# Patient Record
Sex: MALE | Race: WHITE | Employment: FULL TIME | URBAN - NONMETROPOLITAN AREA
[De-identification: names, ages, dates, MRNs, and addresses within clinical notes are randomized per-mention and may not be internally consistent; named-entity substitution may affect disease eponyms.]

---

## 2017-03-20 ENCOUNTER — OFFICE VISIT (OUTPATIENT)
Dept: ENDOCRINOLOGY | Age: 37
End: 2017-03-20

## 2017-03-20 VITALS
DIASTOLIC BLOOD PRESSURE: 70 MMHG | WEIGHT: 191 LBS | HEART RATE: 80 BPM | HEIGHT: 74 IN | SYSTOLIC BLOOD PRESSURE: 128 MMHG | RESPIRATION RATE: 20 BRPM | BODY MASS INDEX: 24.51 KG/M2

## 2017-03-20 DIAGNOSIS — Z79.4 TYPE 2 DIABETES MELLITUS WITHOUT COMPLICATION, WITH LONG-TERM CURRENT USE OF INSULIN (HCC): ICD-10-CM

## 2017-03-20 DIAGNOSIS — E78.00 PURE HYPERCHOLESTEROLEMIA: ICD-10-CM

## 2017-03-20 DIAGNOSIS — E11.9 TYPE 2 DIABETES MELLITUS WITHOUT COMPLICATION, WITH LONG-TERM CURRENT USE OF INSULIN (HCC): ICD-10-CM

## 2017-03-20 PROCEDURE — 99214 OFFICE O/P EST MOD 30 MIN: CPT | Performed by: NURSE PRACTITIONER

## 2017-03-20 RX ORDER — GLIPIZIDE 5 MG/1
TABLET ORAL
Qty: 15 TABLET | Refills: 5 | Status: SHIPPED | OUTPATIENT
Start: 2017-03-20 | End: 2017-06-19 | Stop reason: SDUPTHER

## 2017-03-20 RX ORDER — ATORVASTATIN CALCIUM 20 MG/1
20 TABLET, FILM COATED ORAL DAILY
Qty: 30 TABLET | Refills: 5 | Status: SHIPPED | OUTPATIENT
Start: 2017-03-20 | End: 2017-06-19 | Stop reason: SDUPTHER

## 2017-03-20 ASSESSMENT — ENCOUNTER SYMPTOMS
ABDOMINAL PAIN: 0
NAUSEA: 0
SHORTNESS OF BREATH: 0
TROUBLE SWALLOWING: 0
VOICE CHANGE: 0
VOMITING: 0

## 2017-03-21 LAB
AVERAGE GLUCOSE: 143 MG/DL (ref 66–114)
HBA1C MFR BLD: 6.6 % (ref 4.2–5.8)

## 2017-06-10 LAB
ALBUMIN SERPL-MCNC: 4.5 G/DL (ref 3.2–5.3)
ALK PHOSPHATASE: 81 IU/L (ref 35–121)
ALT SERPL-CCNC: 26 IU/L (ref 5–59)
ANION GAP SERPL CALCULATED.3IONS-SCNC: 13 MMOL/L
AST SERPL-CCNC: 15 IU/L (ref 10–42)
BILIRUB SERPL-MCNC: 0.6 MG/DL (ref 0.2–1.3)
BUN BLDV-MCNC: 11 MG/DL (ref 10–20)
CALCIUM SERPL-MCNC: 9.7 MG/DL (ref 8.7–10.8)
CHLORIDE BLD-SCNC: 102 MMOL/L (ref 95–111)
CHOLESTEROL/HDL RATIO: 2.4
CHOLESTEROL: 122 MG/DL
CO2: 26 MMOL/L (ref 21–32)
CREAT SERPL-MCNC: 0.8 MG/DL (ref 0.5–1.3)
EGFR AFRICAN AMERICAN: 133
EGFR IF NONAFRICAN AMERICAN: 110
GLUCOSE: 106 MG/DL (ref 70–100)
HDLC SERPL-MCNC: 51 MG/DL (ref 40–60)
LDL CHOLESTEROL CALCULATED: 58 MG/DL
LDL/HDL RATIO: 1.1
POTASSIUM SERPL-SCNC: 4.2 MMOL/L (ref 3.5–5.4)
SODIUM BLD-SCNC: 137 MMOL/L (ref 134–147)
TOTAL PROTEIN: 7.4 G/DL (ref 5.8–8)
TRIGL SERPL-MCNC: 67 MG/DL
VLDLC SERPL CALC-MCNC: 13 MG/DL

## 2017-06-12 LAB
AVERAGE GLUCOSE: 131 MG/DL (ref 66–114)
HBA1C MFR BLD: 6.2 % (ref 4.2–5.8)

## 2017-06-19 ENCOUNTER — OFFICE VISIT (OUTPATIENT)
Dept: ENDOCRINOLOGY | Age: 37
End: 2017-06-19

## 2017-06-19 VITALS
RESPIRATION RATE: 16 BRPM | HEART RATE: 76 BPM | BODY MASS INDEX: 23.23 KG/M2 | DIASTOLIC BLOOD PRESSURE: 74 MMHG | WEIGHT: 181 LBS | HEIGHT: 74 IN | SYSTOLIC BLOOD PRESSURE: 123 MMHG

## 2017-06-19 DIAGNOSIS — E78.00 PURE HYPERCHOLESTEROLEMIA: ICD-10-CM

## 2017-06-19 DIAGNOSIS — E11.9 TYPE 2 DIABETES MELLITUS WITHOUT COMPLICATION, WITH LONG-TERM CURRENT USE OF INSULIN (HCC): Primary | ICD-10-CM

## 2017-06-19 DIAGNOSIS — Z79.4 TYPE 2 DIABETES MELLITUS WITHOUT COMPLICATION, WITH LONG-TERM CURRENT USE OF INSULIN (HCC): Primary | ICD-10-CM

## 2017-06-19 DIAGNOSIS — E04.1 LEFT THYROID NODULE: ICD-10-CM

## 2017-06-19 PROCEDURE — 99214 OFFICE O/P EST MOD 30 MIN: CPT | Performed by: INTERNAL MEDICINE

## 2017-06-19 RX ORDER — ATORVASTATIN CALCIUM 20 MG/1
20 TABLET, FILM COATED ORAL DAILY
Qty: 30 TABLET | Refills: 5 | Status: SHIPPED | OUTPATIENT
Start: 2017-06-19 | End: 2017-08-15 | Stop reason: SDUPTHER

## 2017-06-19 RX ORDER — GLIPIZIDE 5 MG/1
TABLET ORAL
Qty: 15 TABLET | Refills: 5 | Status: SHIPPED | OUTPATIENT
Start: 2017-06-19 | End: 2017-08-15 | Stop reason: SDUPTHER

## 2017-08-15 DIAGNOSIS — E78.00 PURE HYPERCHOLESTEROLEMIA: ICD-10-CM

## 2017-08-15 DIAGNOSIS — R56.9 PARTIAL SEIZURE (HCC): ICD-10-CM

## 2017-08-15 DIAGNOSIS — Z79.4 TYPE 2 DIABETES MELLITUS WITHOUT COMPLICATION, WITH LONG-TERM CURRENT USE OF INSULIN (HCC): ICD-10-CM

## 2017-08-15 DIAGNOSIS — E11.9 TYPE 2 DIABETES MELLITUS WITHOUT COMPLICATION, WITH LONG-TERM CURRENT USE OF INSULIN (HCC): ICD-10-CM

## 2017-08-15 RX ORDER — LEVETIRACETAM 750 MG/1
750 TABLET ORAL 2 TIMES DAILY
Qty: 180 TABLET | Refills: 1 | Status: SHIPPED | OUTPATIENT
Start: 2017-08-15 | End: 2017-08-21 | Stop reason: SDUPTHER

## 2017-08-17 RX ORDER — GLIPIZIDE 5 MG/1
TABLET ORAL
Qty: 45 TABLET | Refills: 1 | Status: SHIPPED | OUTPATIENT
Start: 2017-08-17 | End: 2017-10-02 | Stop reason: SDUPTHER

## 2017-08-17 RX ORDER — ATORVASTATIN CALCIUM 20 MG/1
20 TABLET, FILM COATED ORAL DAILY
Qty: 90 TABLET | Refills: 1 | Status: SHIPPED | OUTPATIENT
Start: 2017-08-17 | End: 2017-10-02 | Stop reason: SDUPTHER

## 2017-08-21 ENCOUNTER — OFFICE VISIT (OUTPATIENT)
Dept: NEUROLOGY | Age: 37
End: 2017-08-21
Payer: COMMERCIAL

## 2017-08-21 VITALS
DIASTOLIC BLOOD PRESSURE: 67 MMHG | HEART RATE: 71 BPM | WEIGHT: 174 LBS | BODY MASS INDEX: 22.33 KG/M2 | SYSTOLIC BLOOD PRESSURE: 105 MMHG | HEIGHT: 74 IN

## 2017-08-21 DIAGNOSIS — R56.9 PARTIAL SEIZURE (HCC): Primary | ICD-10-CM

## 2017-08-21 PROCEDURE — 99213 OFFICE O/P EST LOW 20 MIN: CPT | Performed by: PSYCHIATRY & NEUROLOGY

## 2017-08-21 PROCEDURE — G8420 CALC BMI NORM PARAMETERS: HCPCS | Performed by: PSYCHIATRY & NEUROLOGY

## 2017-08-21 PROCEDURE — G8427 DOCREV CUR MEDS BY ELIG CLIN: HCPCS | Performed by: PSYCHIATRY & NEUROLOGY

## 2017-08-21 PROCEDURE — 1036F TOBACCO NON-USER: CPT | Performed by: PSYCHIATRY & NEUROLOGY

## 2017-08-21 RX ORDER — LEVETIRACETAM 750 MG/1
750 TABLET ORAL 2 TIMES DAILY
Qty: 180 TABLET | Refills: 3 | Status: SHIPPED | OUTPATIENT
Start: 2017-08-21 | End: 2018-04-23 | Stop reason: SDUPTHER

## 2017-09-26 LAB
CREATINE, URINE: 124.9 MG/DL
MICROALBUMIN/CREAT 24H UR: <0.7 MG/DL

## 2017-09-28 LAB — C-PEPTIDE: 2.6 NG/ML (ref 0.9–7.1)

## 2017-10-02 ENCOUNTER — OFFICE VISIT (OUTPATIENT)
Dept: ENDOCRINOLOGY | Age: 37
End: 2017-10-02
Payer: COMMERCIAL

## 2017-10-02 VITALS
HEIGHT: 74 IN | SYSTOLIC BLOOD PRESSURE: 130 MMHG | WEIGHT: 171.5 LBS | DIASTOLIC BLOOD PRESSURE: 77 MMHG | RESPIRATION RATE: 20 BRPM | BODY MASS INDEX: 22.01 KG/M2 | HEART RATE: 79 BPM

## 2017-10-02 DIAGNOSIS — E11.9 TYPE 2 DIABETES MELLITUS WITHOUT COMPLICATION, WITH LONG-TERM CURRENT USE OF INSULIN (HCC): ICD-10-CM

## 2017-10-02 DIAGNOSIS — E78.2 MIXED HYPERLIPIDEMIA: ICD-10-CM

## 2017-10-02 DIAGNOSIS — E11.9 TYPE 2 DIABETES MELLITUS WITHOUT COMPLICATION, WITHOUT LONG-TERM CURRENT USE OF INSULIN (HCC): Primary | ICD-10-CM

## 2017-10-02 DIAGNOSIS — Z79.4 TYPE 2 DIABETES MELLITUS WITHOUT COMPLICATION, WITH LONG-TERM CURRENT USE OF INSULIN (HCC): ICD-10-CM

## 2017-10-02 PROCEDURE — G8428 CUR MEDS NOT DOCUMENT: HCPCS | Performed by: NURSE PRACTITIONER

## 2017-10-02 PROCEDURE — G8484 FLU IMMUNIZE NO ADMIN: HCPCS | Performed by: NURSE PRACTITIONER

## 2017-10-02 PROCEDURE — 1036F TOBACCO NON-USER: CPT | Performed by: NURSE PRACTITIONER

## 2017-10-02 PROCEDURE — 99214 OFFICE O/P EST MOD 30 MIN: CPT | Performed by: NURSE PRACTITIONER

## 2017-10-02 PROCEDURE — G8420 CALC BMI NORM PARAMETERS: HCPCS | Performed by: NURSE PRACTITIONER

## 2017-10-02 PROCEDURE — 3046F HEMOGLOBIN A1C LEVEL >9.0%: CPT | Performed by: NURSE PRACTITIONER

## 2017-10-02 RX ORDER — ATORVASTATIN CALCIUM 20 MG/1
20 TABLET, FILM COATED ORAL DAILY
Qty: 90 TABLET | Refills: 1 | Status: SHIPPED | OUTPATIENT
Start: 2017-10-02 | End: 2018-01-22 | Stop reason: SDUPTHER

## 2017-10-02 RX ORDER — GLIPIZIDE 5 MG/1
TABLET ORAL
Qty: 45 TABLET | Refills: 1 | Status: SHIPPED | OUTPATIENT
Start: 2017-10-02 | End: 2018-01-22 | Stop reason: SDUPTHER

## 2017-10-02 ASSESSMENT — ENCOUNTER SYMPTOMS
SHORTNESS OF BREATH: 0
VOICE CHANGE: 0
NAUSEA: 0
TROUBLE SWALLOWING: 0
ABDOMINAL PAIN: 0
VOMITING: 0

## 2017-10-02 NOTE — MR AVS SNAPSHOT
[address] [facility] – [place], [place] [address] [phone]      You Were Seen for:         Comments    Type 2 diabetes mellitus without complication, without long-term current use of insulin ([facility] [address]   [[id number]]         Vital Signs     Blood Pressure Pulse Respirations Height Weight Body Mass Index    130/77 (Site: Right Arm) 79 20 6' 2\" (1.88 m) 171 lb 8 oz (77.8 kg) 22.02 kg/m2    Smoking Status                   Never Smoker           Instructions    Continue Glipizide. Check glucose twice a day rotating times. Labs before next visit. Where to Get Your Medications      These medications were sent to [facility] [facility], [address] [place] [address] [address], [facility] [address]     Phone:  [phone]     atorvastatin 20 MG tablet    glipiZIDE 5 MG tablet         Your Current Medications Are              glipiZIDE (GLUCOTROL) 5 MG tablet Take 0.5 tablets by mouth daily. atorvastatin (LIPITOR) 20 MG tablet Take 1 tablet by mouth daily    levETIRAcetam (KEPPRA) 750 MG tablet Take 1 tablet by mouth 2 times daily    cetirizine (ZYRTEC) 10 MG tablet Take 10 mg by mouth daily    acetaminophen (TYLENOL) 500 MG tablet Take 500 mg by mouth every 6 hours as needed for Pain    Blood Glucose Monitoring Suppl (BLOOD GLUCOSE METER) KIT RELI-ON METER. Use as directed    Glucose Blood (BLOOD GLUCOSE TEST STRIPS) STRP RELI-ON STRIPS + LANCETS. Use to test blood sugar 4 times per day.       Allergies           No Known Allergies         Additional Information        Basic Information     Date Of Birth Sex Race Ethnicity Preferred Language    1980 Male White Non-/Non  English      Problem List as of 10/2/2017  Date Reviewed: 6/19/2017                Pure hypercholesterolemia    Seizure ([facility] [address]    Diabetes mellitus ([facility] [address]      Preventive Care        Date Due    HIV screening is recommended for all people regardless of risk factors

## 2017-10-02 NOTE — PROGRESS NOTES
Subjective:      Patient ID: Marley Hyatt is a 29 y.o. male. HPI     Presents for follow up appointment regarding type 2 diabetes. Last seen 6/19/2017 by Dr. Loree Son. Currently maintained with Glipizide 2.5 mg with dinner. Forgot log book, but reports checking glucose 1-2 times per day. States most readings are in goal. Reports occasionally holding Glipizide with dinner if BS <150 due to hypoglycemia. Denies vision changes, eye exam up to date. No numbness or tingling of the lower ext. Denies polyuria or polydipsia. Past Medical History:   Diagnosis Date    Anxiety     Diabetes mellitus ([address]     Seizures (HCC)      Family History   Problem Relation Age of Onset    Diabetes Mother     Heart Disease Mother     Thyroid Disease Mother     Liver Disease Mother     High Cholesterol Mother     High Blood Pressure Mother     Diabetes Father     High Cholesterol Father     Heart Disease Maternal Grandfather     Substance Abuse Maternal Grandfather     High Blood Pressure Maternal Grandmother     Other Maternal Grandmother      GERD    Diabetes Sister     High Cholesterol Sister      Social History     Social History    Marital status: Single     Spouse name: N/A    Number of children: 0    Years of education: N/A     Occupational History    Not on file. Social History Main Topics    Smoking status: Never Smoker    Smokeless tobacco: Never Used    Alcohol use No    Drug use: No    Sexual activity: Not Currently     Other Topics Concern    Not on file     Social History Narrative     Outpatient Encounter Prescriptions as of 10/2/2017   Medication Sig Dispense Refill    levETIRAcetam (KEPPRA) 750 MG tablet Take 1 tablet by mouth 2 times daily 180 tablet 3    atorvastatin (LIPITOR) 20 MG tablet Take 1 tablet by mouth daily 90 tablet 1    glipiZIDE (GLUCOTROL) 5 MG tablet Take 0.5 tablets by mouth daily.  45 tablet 1    cetirizine (ZYRTEC) 10 MG tablet Take 10 mg by mouth daily      acetaminophen (TYLENOL) 500 MG tablet Take 500 mg by mouth every 6 hours as needed for Pain      Blood Glucose Monitoring Suppl (BLOOD GLUCOSE METER) KIT RELI-ON METER. Use as directed 1 kit 0    Glucose Blood (BLOOD GLUCOSE TEST STRIPS) STRP RELI-ON STRIPS + LANCETS. Use to test blood sugar 4 times per day. 150 strip 5     No facility-administered encounter medications on file as of 10/2/2017. Review of Systems   Constitutional: Negative for fatigue. HENT: Negative for trouble swallowing and voice change. Eyes: Negative for visual disturbance. Respiratory: Negative for shortness of breath. Gastrointestinal: Negative for abdominal pain, nausea and vomiting. Endocrine: Negative for cold intolerance, polydipsia and polyuria. Skin: Negative for rash. Hematological: Does not bruise/bleed easily. Objective:   Physical Exam   Constitutional: He is oriented to person, place, and time. He appears well-developed and well-nourished. HENT:   Head: Normocephalic and atraumatic. Eyes: Conjunctivae are normal.   Neck: Neck supple. Cardiovascular: Normal rate, regular rhythm, normal heart sounds and intact distal pulses. Pulmonary/Chest: Effort normal and breath sounds normal.   Abdominal: Soft. Bowel sounds are normal.   Musculoskeletal: Normal range of motion. Neurological: He is alert and oriented to person, place, and time. Skin: Skin is warm and dry. Psychiatric: He has a normal mood and affect. Assessment / Plan:      1. Type 2 diabetes, short term control unknown. A1c 6.2% 6/2017. Unable to make changes due to lack of information brought to visit. Continue Glipizide. Check glucose BID, rotating times. Encouraged to follow diabetic diet. Obtain A1c before next visit. RTC in 3 months. 2. Hyperlipidemia. Tolerating Lipitor 20 mg nightly. Check CMP and 12 hour fasting lipids before next visit. 2. Seizures, controlled. Follows with neurology. On Keppra.

## 2018-01-15 LAB
ALBUMIN SERPL-MCNC: 4.3 G/DL (ref 3.2–5.3)
ALK PHOSPHATASE: 67 IU/L (ref 35–121)
ALT SERPL-CCNC: 29 IU/L (ref 5–59)
ANION GAP SERPL CALCULATED.3IONS-SCNC: 10 MMOL/L
AST SERPL-CCNC: 18 IU/L (ref 10–42)
BILIRUB SERPL-MCNC: 0.4 MG/DL (ref 0.2–1.3)
BUN BLDV-MCNC: 14 MG/DL (ref 10–20)
CALCIUM SERPL-MCNC: 9.6 MG/DL (ref 8.7–10.8)
CHLORIDE BLD-SCNC: 104 MMOL/L (ref 95–111)
CHOLESTEROL/HDL RATIO: 2.3
CHOLESTEROL: 113 MG/DL
CO2: 31 MMOL/L (ref 21–32)
CREAT SERPL-MCNC: 0.8 MG/DL (ref 0.5–1.3)
EGFR AFRICAN AMERICAN: 132
EGFR IF NONAFRICAN AMERICAN: 109
GLUCOSE: 117 MG/DL (ref 70–100)
HDLC SERPL-MCNC: 50 MG/DL (ref 40–60)
LDL CHOLESTEROL CALCULATED: 50 MG/DL
LDL/HDL RATIO: 1
POTASSIUM SERPL-SCNC: 4.2 MMOL/L (ref 3.5–5.4)
SODIUM BLD-SCNC: 141 MMOL/L (ref 134–147)
TOTAL PROTEIN: 7.4 G/DL (ref 5.8–8)
TRIGL SERPL-MCNC: 63 MG/DL
VLDLC SERPL CALC-MCNC: 13 MG/DL

## 2018-01-16 LAB
AVERAGE GLUCOSE: 148 MG/DL (ref 66–114)
HBA1C MFR BLD: 6.8 % (ref 4.2–5.8)

## 2018-01-22 ENCOUNTER — OFFICE VISIT (OUTPATIENT)
Dept: ENDOCRINOLOGY | Age: 38
End: 2018-01-22
Payer: COMMERCIAL

## 2018-01-22 VITALS
RESPIRATION RATE: 20 BRPM | DIASTOLIC BLOOD PRESSURE: 66 MMHG | HEART RATE: 77 BPM | BODY MASS INDEX: 21.17 KG/M2 | SYSTOLIC BLOOD PRESSURE: 111 MMHG | WEIGHT: 165 LBS | HEIGHT: 74 IN

## 2018-01-22 DIAGNOSIS — E78.2 MIXED HYPERLIPIDEMIA: ICD-10-CM

## 2018-01-22 DIAGNOSIS — E11.9 TYPE 2 DIABETES MELLITUS WITHOUT COMPLICATION, WITHOUT LONG-TERM CURRENT USE OF INSULIN (HCC): Primary | ICD-10-CM

## 2018-01-22 DIAGNOSIS — E11.9 TYPE 2 DIABETES MELLITUS WITHOUT COMPLICATION, WITH LONG-TERM CURRENT USE OF INSULIN (HCC): ICD-10-CM

## 2018-01-22 DIAGNOSIS — Z79.4 TYPE 2 DIABETES MELLITUS WITHOUT COMPLICATION, WITH LONG-TERM CURRENT USE OF INSULIN (HCC): ICD-10-CM

## 2018-01-22 PROCEDURE — G8420 CALC BMI NORM PARAMETERS: HCPCS | Performed by: NURSE PRACTITIONER

## 2018-01-22 PROCEDURE — 3044F HG A1C LEVEL LT 7.0%: CPT | Performed by: NURSE PRACTITIONER

## 2018-01-22 PROCEDURE — G8484 FLU IMMUNIZE NO ADMIN: HCPCS | Performed by: NURSE PRACTITIONER

## 2018-01-22 PROCEDURE — 99214 OFFICE O/P EST MOD 30 MIN: CPT | Performed by: NURSE PRACTITIONER

## 2018-01-22 PROCEDURE — G8427 DOCREV CUR MEDS BY ELIG CLIN: HCPCS | Performed by: NURSE PRACTITIONER

## 2018-01-22 PROCEDURE — 1036F TOBACCO NON-USER: CPT | Performed by: NURSE PRACTITIONER

## 2018-01-22 RX ORDER — ATORVASTATIN CALCIUM 20 MG/1
20 TABLET, FILM COATED ORAL DAILY
Qty: 90 TABLET | Refills: 1 | Status: SHIPPED | OUTPATIENT
Start: 2018-01-22 | End: 2018-05-07 | Stop reason: SDUPTHER

## 2018-01-22 RX ORDER — GLIPIZIDE 5 MG/1
TABLET ORAL
Qty: 45 TABLET | Refills: 1 | Status: SHIPPED | OUTPATIENT
Start: 2018-01-22 | End: 2018-05-07 | Stop reason: SDUPTHER

## 2018-01-22 ASSESSMENT — ENCOUNTER SYMPTOMS
VOICE CHANGE: 0
ABDOMINAL PAIN: 0
VOMITING: 0
TROUBLE SWALLOWING: 0
NAUSEA: 0
SHORTNESS OF BREATH: 0

## 2018-01-22 NOTE — PATIENT INSTRUCTIONS
No changes to medication. Check glucose daily- rotating times. Bring meter to your appointments. Labs before next visit.

## 2018-04-23 ENCOUNTER — OFFICE VISIT (OUTPATIENT)
Dept: NEUROLOGY | Age: 38
End: 2018-04-23
Payer: COMMERCIAL

## 2018-04-23 VITALS
DIASTOLIC BLOOD PRESSURE: 58 MMHG | SYSTOLIC BLOOD PRESSURE: 122 MMHG | HEART RATE: 68 BPM | BODY MASS INDEX: 21.69 KG/M2 | WEIGHT: 169 LBS | HEIGHT: 74 IN

## 2018-04-23 DIAGNOSIS — R56.9 PARTIAL SEIZURE (HCC): Primary | ICD-10-CM

## 2018-04-23 PROCEDURE — 1036F TOBACCO NON-USER: CPT | Performed by: PSYCHIATRY & NEUROLOGY

## 2018-04-23 PROCEDURE — G8420 CALC BMI NORM PARAMETERS: HCPCS | Performed by: PSYCHIATRY & NEUROLOGY

## 2018-04-23 PROCEDURE — 99213 OFFICE O/P EST LOW 20 MIN: CPT | Performed by: PSYCHIATRY & NEUROLOGY

## 2018-04-23 PROCEDURE — G8427 DOCREV CUR MEDS BY ELIG CLIN: HCPCS | Performed by: PSYCHIATRY & NEUROLOGY

## 2018-04-23 RX ORDER — LEVETIRACETAM 750 MG/1
750 TABLET ORAL 2 TIMES DAILY
Qty: 180 TABLET | Refills: 3 | Status: SHIPPED | OUTPATIENT
Start: 2018-04-23 | End: 2019-04-26 | Stop reason: SDUPTHER

## 2018-05-01 LAB
AVERAGE GLUCOSE: 154 MG/DL (ref 66–114)
HBA1C MFR BLD: 7 % (ref 4.2–5.8)

## 2018-05-07 ENCOUNTER — OFFICE VISIT (OUTPATIENT)
Dept: INTERNAL MEDICINE CLINIC | Age: 38
End: 2018-05-07
Payer: COMMERCIAL

## 2018-05-07 VITALS
BODY MASS INDEX: 21.24 KG/M2 | RESPIRATION RATE: 16 BRPM | SYSTOLIC BLOOD PRESSURE: 122 MMHG | HEART RATE: 70 BPM | DIASTOLIC BLOOD PRESSURE: 64 MMHG | HEIGHT: 74 IN | WEIGHT: 165.5 LBS

## 2018-05-07 DIAGNOSIS — Z79.4 TYPE 2 DIABETES MELLITUS WITHOUT COMPLICATION, WITH LONG-TERM CURRENT USE OF INSULIN (HCC): ICD-10-CM

## 2018-05-07 DIAGNOSIS — E11.9 TYPE 2 DIABETES MELLITUS WITHOUT COMPLICATION, WITHOUT LONG-TERM CURRENT USE OF INSULIN (HCC): Primary | ICD-10-CM

## 2018-05-07 DIAGNOSIS — L03.032 CELLULITIS OF LEFT TOE: ICD-10-CM

## 2018-05-07 DIAGNOSIS — E11.9 TYPE 2 DIABETES MELLITUS WITHOUT COMPLICATION, WITH LONG-TERM CURRENT USE OF INSULIN (HCC): ICD-10-CM

## 2018-05-07 LAB — GLUCOSE BLD-MCNC: 120 MG/DL

## 2018-05-07 PROCEDURE — G8427 DOCREV CUR MEDS BY ELIG CLIN: HCPCS | Performed by: NURSE PRACTITIONER

## 2018-05-07 PROCEDURE — 1036F TOBACCO NON-USER: CPT | Performed by: NURSE PRACTITIONER

## 2018-05-07 PROCEDURE — 3045F PR MOST RECENT HEMOGLOBIN A1C LEVEL 7.0-9.0%: CPT | Performed by: NURSE PRACTITIONER

## 2018-05-07 PROCEDURE — 2022F DILAT RTA XM EVC RTNOPTHY: CPT | Performed by: NURSE PRACTITIONER

## 2018-05-07 PROCEDURE — 99214 OFFICE O/P EST MOD 30 MIN: CPT | Performed by: NURSE PRACTITIONER

## 2018-05-07 PROCEDURE — 82962 GLUCOSE BLOOD TEST: CPT | Performed by: NURSE PRACTITIONER

## 2018-05-07 PROCEDURE — G8420 CALC BMI NORM PARAMETERS: HCPCS | Performed by: NURSE PRACTITIONER

## 2018-05-07 RX ORDER — SULFAMETHOXAZOLE AND TRIMETHOPRIM 800; 160 MG/1; MG/1
1 TABLET ORAL 2 TIMES DAILY
Qty: 10 TABLET | Refills: 1 | Status: SHIPPED | OUTPATIENT
Start: 2018-05-07 | End: 2018-05-07 | Stop reason: SDUPTHER

## 2018-05-07 RX ORDER — SULFAMETHOXAZOLE AND TRIMETHOPRIM 800; 160 MG/1; MG/1
1 TABLET ORAL 2 TIMES DAILY
Qty: 10 TABLET | Refills: 0 | Status: SHIPPED | OUTPATIENT
Start: 2018-05-07 | End: 2018-05-12

## 2018-05-07 RX ORDER — ATORVASTATIN CALCIUM 20 MG/1
20 TABLET, FILM COATED ORAL DAILY
Qty: 90 TABLET | Refills: 1 | Status: SHIPPED | OUTPATIENT
Start: 2018-05-07 | End: 2022-08-18 | Stop reason: ALTCHOICE

## 2018-05-07 RX ORDER — GLIPIZIDE 5 MG/1
TABLET ORAL
Qty: 45 TABLET | Refills: 1 | Status: SHIPPED | OUTPATIENT
Start: 2018-05-07 | End: 2022-08-18 | Stop reason: ALTCHOICE

## 2018-05-07 ASSESSMENT — ENCOUNTER SYMPTOMS
VOICE CHANGE: 0
VOMITING: 0
SHORTNESS OF BREATH: 0
TROUBLE SWALLOWING: 0
NAUSEA: 0
ABDOMINAL PAIN: 0

## 2019-01-28 ENCOUNTER — HOSPITAL ENCOUNTER (EMERGENCY)
Age: 39
Discharge: HOME | End: 2019-01-28
Attending: FAMILY MEDICINE
Payer: COMMERCIAL

## 2019-01-28 VITALS
OXYGEN SATURATION: 98 % | HEART RATE: 95 BPM | DIASTOLIC BLOOD PRESSURE: 85 MMHG | SYSTOLIC BLOOD PRESSURE: 124 MMHG | RESPIRATION RATE: 15 BRPM | TEMPERATURE: 97.7 F

## 2019-01-28 DIAGNOSIS — S61.512A LACERATION OF LEFT WRIST WITHOUT COMPLICATION, INITIAL ENCOUNTER: Primary | ICD-10-CM

## 2019-01-28 PROCEDURE — 99282 EMERGENCY DEPT VISIT SF MDM: CPT

## 2019-01-28 PROCEDURE — 12001 RPR S/N/AX/GEN/TRNK 2.5CM/<: CPT

## 2019-01-28 ASSESSMENT — ENCOUNTER SYMPTOMS
VOMITING: 0
EYE DISCHARGE: 0
RHINORRHEA: 0
SHORTNESS OF BREATH: 0
SORE THROAT: 0
COUGH: 0
NAUSEA: 0
EYE REDNESS: 0
WHEEZING: 0
ABDOMINAL PAIN: 0
BACK PAIN: 0
DIARRHEA: 0

## 2019-04-26 ENCOUNTER — OFFICE VISIT (OUTPATIENT)
Dept: NEUROLOGY | Age: 39
End: 2019-04-26
Payer: COMMERCIAL

## 2019-04-26 VITALS
WEIGHT: 174 LBS | DIASTOLIC BLOOD PRESSURE: 62 MMHG | BODY MASS INDEX: 24.36 KG/M2 | HEART RATE: 72 BPM | HEIGHT: 71 IN | SYSTOLIC BLOOD PRESSURE: 108 MMHG

## 2019-04-26 DIAGNOSIS — G40.909 SEIZURE DISORDER (HCC): Primary | ICD-10-CM

## 2019-04-26 DIAGNOSIS — R56.9 PARTIAL SEIZURE (HCC): ICD-10-CM

## 2019-04-26 PROCEDURE — G8427 DOCREV CUR MEDS BY ELIG CLIN: HCPCS | Performed by: PSYCHIATRY & NEUROLOGY

## 2019-04-26 PROCEDURE — G8420 CALC BMI NORM PARAMETERS: HCPCS | Performed by: PSYCHIATRY & NEUROLOGY

## 2019-04-26 PROCEDURE — 99213 OFFICE O/P EST LOW 20 MIN: CPT | Performed by: PSYCHIATRY & NEUROLOGY

## 2019-04-26 PROCEDURE — 1036F TOBACCO NON-USER: CPT | Performed by: PSYCHIATRY & NEUROLOGY

## 2019-04-26 RX ORDER — LEVETIRACETAM 750 MG/1
750 TABLET ORAL 2 TIMES DAILY
Qty: 180 TABLET | Refills: 3 | Status: SHIPPED | OUTPATIENT
Start: 2019-04-26 | End: 2020-03-13

## 2019-04-26 NOTE — PROGRESS NOTES
Has no hand arthritis, no limitation of ROM in any of the four extremities. Lower extremities no edema        DATA:  Results for orders placed or performed in visit on 18   POCT Glucose   Result Value Ref Range    Glucose 120 mg/dL      EE2015  IMPRESSION: This is a mildly abnormal EEG due to the presence of fast beta  activity suggestive of a cortical dysfunction such as seen with metabolic  causes, medication effects or nonspecific encephalopathies. However, there  was no evidence of epileptiform activity appreciated throughout this  Recording. Assessment:     Diagnosis Orders   1. Seizure disorder [facility])        He is doing well. No new events. He is compliant. After detailed discussion with patient and his mother we agreed on the following plan. Plan:  1. Continue with Keppra 750 mg twice a day. Refills given. 2.  Follow up in 12 months or sooner if needed. 3. Call if any questions or concerns. Please call if any questions.      Crystal Davis MD

## 2019-04-26 NOTE — PATIENT INSTRUCTIONS
1.  Continue with Keppra 750 mg twice a day. Refills given. 2.  Follow up in 12 months or sooner if needed. 3. Call if any questions or concerns.

## 2019-09-19 ENCOUNTER — HOSPITAL ENCOUNTER (EMERGENCY)
Age: 39
Discharge: HOME | End: 2019-09-19
Attending: FAMILY MEDICINE
Payer: COMMERCIAL

## 2019-09-19 VITALS
DIASTOLIC BLOOD PRESSURE: 77 MMHG | OXYGEN SATURATION: 98 % | SYSTOLIC BLOOD PRESSURE: 123 MMHG | RESPIRATION RATE: 14 BRPM | TEMPERATURE: 97.7 F | HEART RATE: 84 BPM | BODY MASS INDEX: 25.2 KG/M2 | WEIGHT: 180 LBS

## 2019-09-19 DIAGNOSIS — J34.89 SINUS PRESSURE: ICD-10-CM

## 2019-09-19 DIAGNOSIS — R09.81 SINUS CONGESTION: Primary | ICD-10-CM

## 2019-09-19 PROCEDURE — 99282 EMERGENCY DEPT VISIT SF MDM: CPT

## 2019-09-19 RX ORDER — FLUTICASONE PROPIONATE 50 MCG
1 SPRAY, SUSPENSION (ML) NASAL DAILY
Qty: 1 BOTTLE | Refills: 0 | Status: SHIPPED | OUTPATIENT
Start: 2019-09-19 | End: 2022-08-18

## 2019-09-19 RX ORDER — CETIRIZINE HYDROCHLORIDE 10 MG/1
10 TABLET ORAL DAILY
Qty: 30 TABLET | Refills: 0 | Status: SHIPPED | OUTPATIENT
Start: 2019-09-19 | End: 2019-10-19

## 2019-09-19 ASSESSMENT — ENCOUNTER SYMPTOMS
SINUS PRESSURE: 1
EYE REDNESS: 0
ABDOMINAL PAIN: 0
COLOR CHANGE: 0
WHEEZING: 0
STRIDOR: 0
RHINORRHEA: 1
EYE PAIN: 0
NAUSEA: 0
EYE DISCHARGE: 0
CHEST TIGHTNESS: 0
DIARRHEA: 0
SINUS PAIN: 1
SORE THROAT: 0
SHORTNESS OF BREATH: 0
VOMITING: 0
COUGH: 0
CONSTIPATION: 0

## 2019-09-19 NOTE — ED PROVIDER NOTES
(\"mild\"). Skin: Negative for color change and rash. Neurological: Negative for numbness. PAST MEDICAL HISTORY     Past Medical History:   Diagnosis Date    Anxiety     Diabetes mellitus ([facility] [address]     Seizures ([facility] [address]        SURGICALHISTORY      has no past surgical history on file. CURRENT MEDICATIONS       Discharge Medication List as of 2019  1:07 PM      CONTINUE these medications which have NOT CHANGED    Details   levETIRAcetam (KEPPRA) 750 MG tablet Take 1 tablet by mouth 2 times daily, Disp-180 tablet, R-3Normal      glipiZIDE (GLUCOTROL) 5 MG tablet Take 0.5 tablets by mouth daily. , Disp-45 tablet, R-1Normal      atorvastatin (LIPITOR) 20 MG tablet Take 1 tablet by mouth daily, Disp-90 tablet, R-1Normal      acetaminophen (TYLENOL) 500 MG tablet Take 500 mg by mouth every 6 hours as needed for PainHistorical Med      Blood Glucose Monitoring Suppl (BLOOD GLUCOSE METER) KIT Disp-1 kit, R-0, PrintRELI-ON METER. Use as directed      Glucose Blood (BLOOD GLUCOSE TEST STRIPS) STRP RELI-ON STRIPS + LANCETS. Use to test blood sugar 4 times per day., Disp-150 strip, R-5             ALLERGIES     has No Known Allergies. FAMILY HISTORY     He indicated that his mother is alive. He indicated that his father is alive. He indicated that the status of his sister is unknown. He indicated that his maternal grandmother is . He indicated that his maternal grandfather is . He indicated that his paternal grandmother is . He indicated that his paternal grandfather is . family history includes Diabetes in his father, mother, and sister; Heart Disease in his maternal grandfather and mother; High Blood Pressure in his maternal grandmother and mother; High Cholesterol in his father, mother, and sister; Liver Disease in his mother; Other in his maternal grandmother; Substance Abuse in his maternal grandfather; Thyroid Disease in his mother.     SOCIAL HISTORY       Social History Socioeconomic History    Marital status: Single     Spouse name: Not on file    Number of children: 0    Years of education: Not on file    Highest education level: Not on file   Occupational History    Not on file   Social Needs    Financial resource strain: Not on file    Food insecurity:     Worry: Not on file     Inability: Not on file    Transportation needs:     Medical: Not on file     Non-medical: Not on file   Tobacco Use    Smoking status: Never Smoker    Smokeless tobacco: Never Used   Substance and Sexual Activity    Alcohol use: No     Alcohol/week: 0.0 standard drinks    Drug use: No    Sexual activity: Not Currently   Lifestyle    Physical activity:     Days per week: Not on file     Minutes per session: Not on file    Stress: Not on file   Relationships    Social connections:     Talks on phone: Not on file     Gets together: Not on file     Attends Rastafarian service: Not on file     Active member of club or organization: Not on file     Attends meetings of clubs or organizations: Not on file     Relationship status: Not on file    Intimate partner violence:     Fear of current or ex partner: Not on file     Emotionally abused: Not on file     Physically abused: Not on file     Forced sexual activity: Not on file   Other Topics Concern    Not on file   Social History Narrative    Not on file       PHYSICAL EXAM     INITIAL VITALS:  weight is 180 lb (81.6 kg). His oral temperature is 97.7 °F (36.5 °C). His blood pressure is 123/77 and his pulse is 84. His respiration is 14 and oxygen saturation is 98%. Physical Exam   Constitutional: He is oriented to person, place, and time. He appears well-developed and well-nourished. No distress. HENT:   Head: Normocephalic and atraumatic. Right Ear: Tympanic membrane, external ear and ear canal normal. No drainage. Left Ear: Tympanic membrane, external ear and ear canal normal. No drainage. Nose: Rhinorrhea present.  Left sinus exhibits maxillary sinus tenderness (mild) and frontal sinus tenderness (mild). Mouth/Throat: Oropharynx is clear and moist and mucous membranes are normal. No oropharyngeal exudate. Tonsils are 1+ on the right. Tonsils are 1+ on the left. Eyes: Pupils are equal, round, and reactive to light. EOM are normal. Right eye exhibits no discharge. Left eye exhibits no discharge. Right conjunctiva is not injected. Right conjunctiva has no hemorrhage. Left conjunctiva is not injected. Left conjunctiva has no hemorrhage. No scleral icterus. Cardiovascular: Normal rate, regular rhythm, normal heart sounds and intact distal pulses. Exam reveals no gallop and no friction rub. No murmur heard. Pulmonary/Chest: Effort normal. No respiratory distress. He has no wheezes. He has no rhonchi. He has no rales. Abdominal: Soft. There is no tenderness. There is no guarding. Neurological: He is alert and oriented to person, place, and time. He has normal strength. No sensory deficit. Skin: Skin is warm and dry. He is not diaphoretic. DIFFERENTIAL DIAGNOSIS:   viral upper respiratory infection, sinusitis, seasonal allergies, bronchitis, pneumonia    DIAGNOSTIC RESULTS       No orders to display         LABS:   Labs Reviewed - No data to display    EMERGENCY DEPARTMENT COURSE:   Vitals:    Vitals:    09/19/19 1223   BP: 123/77   Pulse: 84   Resp: 14   Temp: 97.7 °F (36.5 °C)   TempSrc: Oral   SpO2: 98%   Weight: 180 lb (81.6 kg)       MDM    Patient was seen and evaluated in the emergency department, patient appeared to be no acute distress, vital signs were reviewed, no significant findings were noted. Physical exam was completed, mild maxillary sinus tenderness noted, no significant congestion or discharge was noted from the nose. Throat showed no abnormality. Patient appears to have viral URI like symptoms.   I discussed my findings my plan of care with the patient and his mother and they are amenable with discharge. While here in the emergency department patient maintained stable course and appropriate for discharge. Medications - No data to display    Patient was seenindependently by myself. The patient's final impression and disposition and plan was determined by myself. CRITICAL CARE:   None    CONSULTS:  None    PROCEDURES:  None    FINAL IMPRESSION     1. Sinus congestion    2. Sinus pressure          DISPOSITION/PLAN   Patient discharged in stable condition. PATIENT REFERREDTO:  Debbi Cervantes MD  [place] ,[place]  [phone]    Call in 1 week  If symptoms worsen      DISCHARGE MEDICATIONS:  Discharge Medication List as of 9/19/2019  1:07 PM      START taking these medications    Details   fluticasone (FLONASE) 50 MCG/ACT nasal spray 1 spray by Each Nostril route daily, Disp-1 Bottle, R-0Print             (Please note that portions of this note were completed with a voice recognition program.  Efforts were made to edit the dictations but occasionally words are mis-transcribed.)    Provider:  I personally performed the services described in the documentation,reviewed and edited the documentation which was dictated to the scribe in my presence, and it accurately records my words and actions.     Jose Hearn CNP 09/19/19 1:43 PM    Radha Hearn, APRN - [facility]        [employer], APRN - CNP  09/19/19 [id number]

## 2020-03-13 RX ORDER — LEVETIRACETAM 750 MG/1
TABLET ORAL
Qty: 180 TABLET | Refills: 3 | Status: SHIPPED | OUTPATIENT
Start: 2020-03-13 | End: 2020-04-27 | Stop reason: SDUPTHER

## 2020-04-27 ENCOUNTER — OFFICE VISIT (OUTPATIENT)
Dept: NEUROLOGY | Age: 40
End: 2020-04-27
Payer: COMMERCIAL

## 2020-04-27 VITALS
DIASTOLIC BLOOD PRESSURE: 68 MMHG | BODY MASS INDEX: 25.03 KG/M2 | HEART RATE: 79 BPM | SYSTOLIC BLOOD PRESSURE: 108 MMHG | HEIGHT: 74 IN | WEIGHT: 195 LBS

## 2020-04-27 PROCEDURE — G8427 DOCREV CUR MEDS BY ELIG CLIN: HCPCS | Performed by: PSYCHIATRY & NEUROLOGY

## 2020-04-27 PROCEDURE — G8419 CALC BMI OUT NRM PARAM NOF/U: HCPCS | Performed by: PSYCHIATRY & NEUROLOGY

## 2020-04-27 PROCEDURE — 99213 OFFICE O/P EST LOW 20 MIN: CPT | Performed by: PSYCHIATRY & NEUROLOGY

## 2020-04-27 PROCEDURE — 1036F TOBACCO NON-USER: CPT | Performed by: PSYCHIATRY & NEUROLOGY

## 2020-04-27 RX ORDER — PIOGLITAZONEHYDROCHLORIDE 15 MG/1
15 TABLET ORAL DAILY
COMMUNITY

## 2020-04-27 RX ORDER — LEVETIRACETAM 750 MG/1
TABLET ORAL
Qty: 180 TABLET | Refills: 3 | Status: SHIPPED | OUTPATIENT
Start: 2020-04-27 | End: 2021-02-09 | Stop reason: SDUPTHER

## 2020-04-28 NOTE — PROGRESS NOTES
NEUROLOGY OUT PATIENT FOLLOW UP NOTE:  4/27/202011:52 AM    Marlen Laughlin is here for follow up for seizure. He is doing well. He denies any seizure or episodes of confusion. He is on Keppra 750 mg twice a day. He denies any side effects to the medication. He comes in today with his mother to discuss the plan of care. ROS:  Respiratory : no cough, no shortness of breath  Cardiac: no chest pain. No palpitations. Renal : no flank pain, no hematuria    Skin: no rash      No Known Allergies    Current Outpatient Medications:     metFORMIN (GLUCOPHAGE) 1000 MG tablet, Take 1,000 mg by mouth 2 times daily (with meals), Disp: , Rfl:     pioglitazone (ACTOS) 15 MG tablet, Take 15 mg by mouth daily, Disp: , Rfl:     levETIRAcetam (KEPPRA) 750 MG tablet, TAKE 1 TABLET BY MOUTH TWO  TIMES DAILY, Disp: 180 tablet, Rfl: 3    atorvastatin (LIPITOR) 20 MG tablet, Take 1 tablet by mouth daily, Disp: 90 tablet, Rfl: 1    Blood Glucose Monitoring Suppl (BLOOD GLUCOSE METER) KIT, RELI-ON METER. Use as directed, Disp: 1 kit, Rfl: 0    Glucose Blood (BLOOD GLUCOSE TEST STRIPS) STRP, RELI-ON STRIPS + LANCETS. Use to test blood sugar 4 times per day., Disp: 150 strip, Rfl: 5    fluticasone (FLONASE) 50 MCG/ACT nasal spray, 1 spray by Each Nostril route daily (Patient not taking: Reported on 4/27/2020), Disp: 1 Bottle, Rfl: 0    glipiZIDE (GLUCOTROL) 5 MG tablet, Take 0.5 tablets by mouth daily. (Patient not taking: Reported on 4/27/2020), Disp: 45 tablet, Rfl: 1    acetaminophen (TYLENOL) 500 MG tablet, Take 500 mg by mouth every 6 hours as needed for Pain, Disp: , Rfl:       PE:   Vitals:    04/27/20 1137   BP: 108/68   Site: Left Upper Arm   Position: Sitting   Cuff Size: Large Adult   Pulse: 79   Weight: 195 lb (88.5 kg)   Height: 6' 2\" (1.88 m)     General Appearance:  awake, alert, oriented, in no acute distress  Gen: NAD, Language is Intact.  Skin: no rash, lesion, dry  to touch. warm  Head: no rash, no icterus  Neck: There is no carotid bruits. The Neck is supple. There is no neck lymphadenopathy. Neuro: CN 2-12 grossly intact with no focal deficits. Power 5/5 Throughout symmetric, Reflexes are  symmetric. Long tracts are intact. Cerebellar exam is Intact. Sensory exam is intact to light touch. Gait is intact. Musculoskeletal:  Has no hand arthritis, no limitation of ROM in any of the four extremities. Lower extremities no edema  The abdomen is soft,  intact bowel sounds. DATA:  Results for orders placed or performed in visit on 05/07/18   POCT Glucose   Result Value Ref Range    Glucose 120 mg/dL             Assessment:     Diagnosis Orders   1. Partial seizure (HCC)  levETIRAcetam (KEPPRA) 750 MG tablet        He is doing well. He denies any seizure or episodes of confusion. He is on Keppra 750 mg twice a day. He denies any side effects to the medication. After detailed discussion with patient and his mother we agreed on the following plan. Plan:  1. Continue with Keppra 750 mg twice a day. Refills given. 2.  Follow up in 12 months or sooner if needed. 3. Call if any questions or concerns. Please call if any questions. Time spent evaluating patient, reviewing records, counseling was more than 15 min. All patient's questions were answered. Please call if any questions.     Stone Cadena MD

## 2021-02-09 DIAGNOSIS — R56.9 PARTIAL SEIZURE (HCC): Primary | ICD-10-CM

## 2021-02-09 RX ORDER — LEVETIRACETAM 750 MG/1
TABLET ORAL
Qty: 180 TABLET | Refills: 3 | Status: SHIPPED | OUTPATIENT
Start: 2021-02-09 | End: 2021-04-26 | Stop reason: SDUPTHER

## 2021-04-26 ENCOUNTER — OFFICE VISIT (OUTPATIENT)
Dept: NEUROLOGY | Age: 41
End: 2021-04-26
Payer: COMMERCIAL

## 2021-04-26 VITALS
DIASTOLIC BLOOD PRESSURE: 78 MMHG | HEIGHT: 74 IN | OXYGEN SATURATION: 98 % | HEART RATE: 75 BPM | BODY MASS INDEX: 24.64 KG/M2 | WEIGHT: 192 LBS | SYSTOLIC BLOOD PRESSURE: 116 MMHG

## 2021-04-26 DIAGNOSIS — G40.909 SEIZURE DISORDER (HCC): Primary | ICD-10-CM

## 2021-04-26 PROCEDURE — 99213 OFFICE O/P EST LOW 20 MIN: CPT | Performed by: PSYCHIATRY & NEUROLOGY

## 2021-04-26 PROCEDURE — G8427 DOCREV CUR MEDS BY ELIG CLIN: HCPCS | Performed by: PSYCHIATRY & NEUROLOGY

## 2021-04-26 PROCEDURE — 1036F TOBACCO NON-USER: CPT | Performed by: PSYCHIATRY & NEUROLOGY

## 2021-04-26 PROCEDURE — G8420 CALC BMI NORM PARAMETERS: HCPCS | Performed by: PSYCHIATRY & NEUROLOGY

## 2021-04-26 RX ORDER — LEVETIRACETAM 750 MG/1
TABLET ORAL
Qty: 180 TABLET | Refills: 3 | Status: SHIPPED | OUTPATIENT
Start: 2021-04-26 | End: 2022-04-25 | Stop reason: SDUPTHER

## 2021-04-26 NOTE — PROGRESS NOTES
NEUROLOGY OUT PATIENT FOLLOW UP NOTE:      Renata Hodges is here for follow up for seizure. He is doing well. He denies any seizure or episodes of confusion. He is compliant. He is on Keppra 750 mg twice a day. He denies any side effects to the medication. He comes in today with his mother to discuss the plan of care. ROS:  Respiratory : no cough, no shortness of breath  Cardiac: no chest pain. No palpitations. Renal : no flank pain, no hematuria    Skin: no rash      No Known Allergies    Current Outpatient Medications on File Prior to Visit   Medication Sig Dispense Refill    SITagliptin (JANUVIA) 100 MG tablet Take 100 mg by mouth daily      metFORMIN (GLUCOPHAGE) 1000 MG tablet Take 1,000 mg by mouth 2 times daily (with meals)      pioglitazone (ACTOS) 15 MG tablet Take 15 mg by mouth daily      atorvastatin (LIPITOR) 20 MG tablet Take 1 tablet by mouth daily 90 tablet 1    acetaminophen (TYLENOL) 500 MG tablet Take 500 mg by mouth every 6 hours as needed for Pain      fluticasone (FLONASE) 50 MCG/ACT nasal spray 1 spray by Each Nostril route daily (Patient not taking: Reported on 4/27/2020) 1 Bottle 0    glipiZIDE (GLUCOTROL) 5 MG tablet Take 0.5 tablets by mouth daily. (Patient not taking: Reported on 4/27/2020) 45 tablet 1    Blood Glucose Monitoring Suppl (BLOOD GLUCOSE METER) KIT RELI-ON METER. Use as directed (Patient not taking: Reported on 4/26/2021) 1 kit 0    Glucose Blood (BLOOD GLUCOSE TEST STRIPS) STRP RELI-ON STRIPS + LANCETS. Use to test blood sugar 4 times per day. (Patient not taking: Reported on 4/26/2021) 150 strip 5     No current facility-administered medications on file prior to visit. PE:   Vitals:    04/26/21 1050   BP: 116/78   Site: Left Upper Arm   Position: Sitting   Cuff Size: Medium Adult   Pulse: 75   SpO2: 98%   Weight: 192 lb (87.1 kg)   Height: 6' 2\" (1.88 m)      General Appearance:  awake, alert, oriented, in no acute distress, he is wearing a mask. Gen: NAD, Language is Intact. Skin: no rash, lesion, dry  to touch. warm  Head: no rash, no icterus  Neck: There is no carotid bruits. The Neck is supple. There is no neck lymphadenopathy. Neuro: CN 2-12 grossly intact with no focal deficits. Power 5/5 Throughout symmetric, Reflexes are  symmetric. Long tracts are intact. Cerebellar exam is Intact. Sensory exam is intact to light touch. Gait is intact. Musculoskeletal:  Has no hand arthritis, no limitation of ROM in any of the four extremities. Lower extremities no edema  The abdomen is soft,  intact bowel sounds. DATA:  Results for orders placed or performed in visit on 05/07/18   POCT Glucose   Result Value Ref Range    Glucose 120 mg/dL           Assessment:     Diagnosis Orders   1. Seizure disorder [facility])          He denies any seizure or episodes of confusion. He is on Keppra 750 mg twice a day. He is compliant. He denies any side effects to the medication. The patient reports his blood sugar is not well controlled, he was counseled about the importance of maintaining his blood sugar at acceptable range and given suggestions on how to do so, and to work with his family Dr on this. After detailed discussion with patient and his mother we agreed on the following plan. Plan:  1. Continue with Keppra 750 mg twice a day. Refills given. 2. CBC, BMP  3. Follow up in 12 months or sooner if needed. 4. Call if any questions or concerns. Please call if any questions.      Time spent 22 min     Alissa Jenkins MD

## 2021-04-26 NOTE — PATIENT INSTRUCTIONS
1. Continue with Keppra 750 mg twice a day. Refills given. 2. CBC, BMP  3. Follow up in 12 months or sooner if needed. 4. Call if any questions or concerns.

## 2021-05-25 ENCOUNTER — HOSPITAL ENCOUNTER (EMERGENCY)
Age: 41
Discharge: HOME | End: 2021-05-25
Payer: COMMERCIAL

## 2021-05-25 VITALS
RESPIRATION RATE: 18 BRPM | SYSTOLIC BLOOD PRESSURE: 119 MMHG | TEMPERATURE: 98 F | WEIGHT: 195 LBS | DIASTOLIC BLOOD PRESSURE: 80 MMHG | HEART RATE: 106 BPM | HEIGHT: 74 IN | BODY MASS INDEX: 25.03 KG/M2 | OXYGEN SATURATION: 96 %

## 2021-05-25 DIAGNOSIS — M54.31 SCIATICA OF RIGHT SIDE: ICD-10-CM

## 2021-05-25 DIAGNOSIS — S39.012A BACK STRAIN, INITIAL ENCOUNTER: Primary | ICD-10-CM

## 2021-05-25 PROCEDURE — 99213 OFFICE O/P EST LOW 20 MIN: CPT

## 2021-05-25 PROCEDURE — 99202 OFFICE O/P NEW SF 15 MIN: CPT | Performed by: NURSE PRACTITIONER

## 2021-05-25 PROCEDURE — 6360000002 HC RX W HCPCS: Performed by: NURSE PRACTITIONER

## 2021-05-25 PROCEDURE — 96372 THER/PROPH/DIAG INJ SC/IM: CPT

## 2021-05-25 RX ORDER — PREDNISONE 20 MG/1
20 TABLET ORAL 2 TIMES DAILY
Qty: 10 TABLET | Refills: 0 | Status: SHIPPED | OUTPATIENT
Start: 2021-05-25 | End: 2021-05-30

## 2021-05-25 RX ORDER — MENTHOL/CAMPHOR
OINTMENT (GRAM) TOPICAL
Refills: 0 | COMMUNITY
Start: 2021-05-25 | End: 2022-08-18 | Stop reason: ALTCHOICE

## 2021-05-25 RX ORDER — CYCLOBENZAPRINE HCL 10 MG
10 TABLET ORAL 3 TIMES DAILY PRN
Qty: 15 TABLET | Refills: 0 | Status: SHIPPED | OUTPATIENT
Start: 2021-05-25 | End: 2021-05-30

## 2021-05-25 RX ORDER — KETOROLAC TROMETHAMINE 30 MG/ML
60 INJECTION, SOLUTION INTRAMUSCULAR; INTRAVENOUS ONCE
Status: COMPLETED | OUTPATIENT
Start: 2021-05-25 | End: 2021-05-25

## 2021-05-25 RX ADMIN — KETOROLAC TROMETHAMINE 60 MG: 30 INJECTION, SOLUTION INTRAMUSCULAR at 18:25

## 2021-05-25 ASSESSMENT — ENCOUNTER SYMPTOMS
ABDOMINAL SWELLING: 0
COLOR CHANGE: 0
COUGH: 0
APNEA: 0
CHOKING: 0
BACK PAIN: 1
STRIDOR: 0
SHORTNESS OF BREATH: 0
ABDOMINAL PAIN: 0
BOWEL INCONTINENCE: 0
WHEEZING: 0
CHEST TIGHTNESS: 0

## 2021-05-25 ASSESSMENT — PAIN DESCRIPTION - DESCRIPTORS: DESCRIPTORS: ACHING

## 2021-05-25 ASSESSMENT — PAIN DESCRIPTION - PAIN TYPE: TYPE: ACUTE PAIN

## 2021-05-25 NOTE — ED TRIAGE NOTES
Patient states he was at work when his lower back started hurting with pains shooting down his right leg.

## 2021-05-25 NOTE — ED NOTES
Patient verbalized understanding of discharge instructions and medications prescribed. Denies questions or concerns at this time.       Lottie Moncada RN  05/25/21 [id number]

## 2021-05-25 NOTE — ED PROVIDER NOTES
[facility]  Urgent Care Encounter      CHIEF COMPLAINT       Chief Complaint   Patient presents with    Back Pain       Nurses Notes reviewed and I agree except as noted in the HPI. HISTORY OFPRESENT ILLNESS   Paul Dow is a 45 y.o. The history is provided by the patient. No  was used. Back Pain  Location:  Lumbar spine  Quality:  Cramping and stiffness  Radiates to:  R knee, R foot and R thigh  Pain severity:  Moderate  Pain is:  Same all the time  Onset quality:  Sudden  Duration:  1 day  Timing:  Constant  Progression:  Worsening  Chronicity:  New  Context: lifting heavy objects, occupational injury, physical stress and twisting    Context: not emotional stress, not falling, not jumping from heights, not MCA, not MVA, not pedestrian accident, not recent illness and not recent injury    Relieved by:  Nothing  Worsened by:  Twisting, movement, standing and bending  Ineffective treatments:  OTC medications  Associated symptoms: leg pain, numbness and tingling    Associated symptoms: no abdominal pain, no abdominal swelling, no bladder incontinence, no bowel incontinence, no chest pain, no dysuria, no fever, no headaches, no paresthesias, no pelvic pain, no perianal numbness, no weakness and no weight loss    Risk factors: no hx of cancer, no hx of osteoporosis, no lack of exercise, no menopause, not obese, not pregnant, no recent surgery, no steroid use and no vascular disease        REVIEW OF SYSTEMS     Review of Systems   Constitutional: Negative for activity change, appetite change, chills, diaphoresis, fatigue, fever, unexpected weight change and weight loss. Respiratory: Negative for apnea, cough, choking, chest tightness, shortness of breath, wheezing and stridor. Cardiovascular: Negative for chest pain, palpitations and leg swelling. Gastrointestinal: Negative for abdominal pain and bowel incontinence.    Genitourinary: Negative for bladder incontinence, dysuria and pelvic pain. Musculoskeletal: Positive for back pain, gait problem and myalgias. Negative for arthralgias, joint swelling, neck pain and neck stiffness. Skin: Negative for color change, pallor, rash and wound. Neurological: Positive for tingling and numbness. Negative for dizziness, weakness, light-headedness, headaches and paresthesias. PAST MEDICAL HISTORY         Diagnosis Date    Anxiety     Diabetes mellitus ([facility]     [facility] ([facility]        SURGICAL HISTORY     Patient  has no past surgical history on file. CURRENT MEDICATIONS       Previous Medications    ACETAMINOPHEN (TYLENOL) 500 MG TABLET    Take 500 mg by mouth every 6 hours as needed for Pain    ATORVASTATIN (LIPITOR) 20 MG TABLET    Take 1 tablet by mouth daily    BLOOD GLUCOSE MONITORING SUPPL (BLOOD GLUCOSE METER) KIT    RELI-ON METER. Use as directed    FLUTICASONE (FLONASE) 50 MCG/ACT NASAL SPRAY    1 spray by Each Nostril route daily    GLIPIZIDE (GLUCOTROL) 5 MG TABLET    Take 0.5 tablets by mouth daily. GLUCOSE BLOOD (BLOOD GLUCOSE TEST STRIPS) STRP    RELI-ON STRIPS + LANCETS. Use to test blood sugar 4 times per day. LEVETIRACETAM (KEPPRA) 750 MG TABLET    TAKE 1 TABLET BY MOUTH TWO  TIMES DAILY    METFORMIN (GLUCOPHAGE) 1000 MG TABLET    Take 1,000 mg by mouth 2 times daily (with meals)    PIOGLITAZONE (ACTOS) 15 MG TABLET    Take 15 mg by mouth daily    SITAGLIPTIN (JANUVIA) 100 MG TABLET    Take 100 mg by mouth daily       ALLERGIES     Patient is has No Known Allergies. FAMILY HISTORY     Patient's family history includes Diabetes in his father, mother, and sister; Heart Disease in his maternal grandfather and mother; High Blood Pressure in his maternal grandmother and mother; High Cholesterol in his father, mother, and sister; Liver Disease in his mother; Other in his maternal grandmother; Substance Abuse in his maternal grandfather; Thyroid Disease in his mother.     SOCIAL HISTORY Patient  reports that he has never smoked. He has never used smokeless tobacco. He reports that he does not drink alcohol and does not use drugs. PHYSICAL EXAM     ED TRIAGE VITALS  BP: 124/81, Temp: 98 °F (36.7 °C), Pulse: 110, Resp: 18, SpO2: 96 %  Physical Exam  Vitals and nursing note reviewed. Constitutional:       Appearance: Normal appearance. HENT:      Head: Normocephalic and atraumatic. Right Ear: External ear normal.      Left Ear: External ear normal.   Eyes:      Extraocular Movements: Extraocular movements intact. Conjunctiva/sclera: Conjunctivae normal.   Cardiovascular:      Pulses: Normal pulses. No decreased pulses. Dorsalis pedis pulses are 2+ on the right side. Posterior tibial pulses are 2+ on the right side. Pulmonary:      Effort: Pulmonary effort is normal.   Musculoskeletal:         General: Tenderness present. No swelling, deformity or signs of injury. Cervical back: Normal range of motion. Lumbar back: No swelling, edema, deformity, signs of trauma, lacerations, spasms, tenderness or bony tenderness. Normal range of motion. Negative right straight leg raise test and negative left straight leg raise test. No scoliosis. Back:       Right lower leg: No edema. Left lower leg: No edema. Skin:     General: Skin is warm. Neurological:      General: No focal deficit present. Mental Status: He is alert and oriented to person, place, and time. Cranial Nerves: No cranial nerve deficit. Sensory: No sensory deficit. Motor: No weakness. Coordination: Coordination normal.      Gait: Gait normal.      Deep Tendon Reflexes: Reflexes normal.   Psychiatric:         Mood and Affect: Mood normal.         Behavior: Behavior normal.         Thought Content: Thought content normal.         Judgment: Judgment normal.         DIAGNOSTIC RESULTS   Labs:No results found for this visit on 05/25/21.     IMAGING:  No orders to display     URGENT CARE COURSE:     Vitals:    05/25/21 1752   BP: 124/81   Pulse: 110   Resp: 18   Temp: 98 °F (36.7 °C)   TempSrc: Temporal   SpO2: 96%   Weight: 195 lb (88.5 kg)   Height: 6' 2\" (1.88 m)       Medications   ketorolac (TORADOL) injection 60 mg (60 mg Intramuscular Given 5/25/21 1825)     PROCEDURES:  None  FINAL IMPRESSION      1. Back strain, initial encounter    2. Sciatica of right side        DISPOSITION/PLAN   Discharge - Pending Orders Complete     The patient will be instructed to continue taking anti-inflammatory medication, and given a Rx for short course of muscle relaxer's. Rest,Ice 15-20 minutes TID x 2 days,Then Heat 15-20 minutes TID as needed The patient will be given back stretching exercises, and instructed to follow up with their PCP or community clinic for further evaluation. The patient should return to the ED if the back pain worsens, or if they experience incontinence, numbness or tingling in the legs, or inability to ambulate. The patient is in agreement with this plan. The patient tolerated their visit well. The patient and / or the family were informed of the results of any tests, a time was given to answer questions, a plan was proposed and they agreed with plan. Follow up with PCP ×2-3 days for reevaluation and further management of care. PATIENT REFERRED TO:  Jayshree Flores MD  [address] [place] [address]  [phone]    Schedule an appointment as soon as possible for a visit       [address] [address]  [phone]  Call   If symptoms do not improve or continue.     DISCHARGE MEDICATIONS:  New Prescriptions    CYCLOBENZAPRINE (FLEXERIL) 10 MG TABLET    Take 1 tablet by mouth 3 times daily as needed for Muscle spasms    MAGNESIUM SULFATE, LAXATIVE, (EPSOM SALT) GRAN    Follow package directions for warm water soaks    MENTHOL-CAMPHOR (TIGER BALM EXTRA STRENGTH) 11-10 % OINT    Follow package directions for topical use    PREDNISONE (DELTASONE) 20 MG TABLET    Take 1 tablet by mouth 2 times daily for 5 days     Current Discharge Medication List          Nancey Dandy, APRN - CNP Nancey Dandy, APRN - CNP  05/25/21 [address] Pinky Youngblood, DAVION - CNP  05/25/21 7361

## 2021-11-30 LAB
AVERAGE GLUCOSE: NORMAL
HBA1C MFR BLD: 8.5 %

## 2022-03-16 LAB
AVERAGE GLUCOSE: NORMAL
HBA1C MFR BLD: 9.5 %

## 2022-04-17 LAB
ABSOLUTE BASO #: 0.1 X10E9/L (ref 0–0.2)
ABSOLUTE EOS #: 0.4 X10E9/L (ref 0–0.4)
ABSOLUTE LYMPH #: 2.6 X10E9/L (ref 1–3.5)
ABSOLUTE MONO #: 0.4 X10E9/L (ref 0–0.9)
ABSOLUTE NEUT #: 3 X10E9/L (ref 1.5–6.6)
ANION GAP SERPL CALCULATED.3IONS-SCNC: 7 MMOL/L (ref 5–15)
BASOPHILS RELATIVE PERCENT: 1.1 %
BUN BLDV-MCNC: 13 MG/DL (ref 5–23)
CALCIUM SERPL-MCNC: 9.6 MG/DL (ref 8.5–10.5)
CHLORIDE BLD-SCNC: 99 MMOL/L (ref 98–109)
CO2: 30 MMOL/L (ref 22–32)
CREAT SERPL-MCNC: 0.82 MG/DL (ref 0.6–1.3)
EGFR AFRICAN AMERICAN: >60 ML/MIN/1.73SQ.M
EGFR IF NONAFRICAN AMERICAN: >60 ML/MIN/1.73SQ.M
EOSINOPHILS RELATIVE PERCENT: 5.8 %
GLUCOSE: 195 MG/DL (ref 65–99)
HCT VFR BLD CALC: 43.9 % (ref 39–49)
HEMOGLOBIN: 14.7 G/DL (ref 13–17)
LYMPHOCYTE %: 40.4 %
MCH RBC QN AUTO: 28.8 PG (ref 27–34)
MCHC RBC AUTO-ENTMCNC: 33.5 G/DL (ref 32–36)
MCV RBC AUTO: 86 FL (ref 80–100)
MONOCYTES # BLD: 6.5 %
NEUTROPHILS RELATIVE PERCENT: 46.2 %
PDW BLD-RTO: 12.6 % (ref 11.5–15)
PLATELETS: 277 X10E9/L (ref 150–450)
PMV BLD AUTO: 8.2 FL (ref 7–12)
POTASSIUM SERPL-SCNC: 4.1 MMOL/L (ref 3.5–5)
RBC: 5.11 X10E12/L (ref 4.1–5.7)
SODIUM BLD-SCNC: 136 MMOL/L (ref 134–146)
WBC: 6.4 X10E9/L (ref 4–11)

## 2022-04-25 ENCOUNTER — OFFICE VISIT (OUTPATIENT)
Dept: NEUROLOGY | Age: 42
End: 2022-04-25
Payer: COMMERCIAL

## 2022-04-25 VITALS
HEIGHT: 74 IN | RESPIRATION RATE: 16 BRPM | BODY MASS INDEX: 24.79 KG/M2 | WEIGHT: 193.2 LBS | DIASTOLIC BLOOD PRESSURE: 78 MMHG | SYSTOLIC BLOOD PRESSURE: 102 MMHG | OXYGEN SATURATION: 98 % | HEART RATE: 83 BPM

## 2022-04-25 DIAGNOSIS — G40.909 SEIZURE DISORDER (HCC): Primary | ICD-10-CM

## 2022-04-25 PROCEDURE — G8427 DOCREV CUR MEDS BY ELIG CLIN: HCPCS | Performed by: PSYCHIATRY & NEUROLOGY

## 2022-04-25 PROCEDURE — 99213 OFFICE O/P EST LOW 20 MIN: CPT | Performed by: PSYCHIATRY & NEUROLOGY

## 2022-04-25 PROCEDURE — 1036F TOBACCO NON-USER: CPT | Performed by: PSYCHIATRY & NEUROLOGY

## 2022-04-25 PROCEDURE — G8420 CALC BMI NORM PARAMETERS: HCPCS | Performed by: PSYCHIATRY & NEUROLOGY

## 2022-04-25 RX ORDER — LEVETIRACETAM 750 MG/1
TABLET ORAL
Qty: 180 TABLET | Refills: 3 | Status: SHIPPED | OUTPATIENT
Start: 2022-04-25

## 2022-04-25 NOTE — PROGRESS NOTES
NEUROLOGY OUT PATIENT FOLLOW UP NOTE:      Leonardo Jennings is here for follow up for seizure. He is doing well. He denies any seizure or episodes of confusion. He is compliant. He is on Keppra 750 mg twice a day. He denies any side effects to the medication. He comes in today with his mother to discuss the plan of care. No Known Allergies    Current Outpatient Medications on File Prior to Visit   Medication Sig Dispense Refill    SITagliptin (JANUVIA) 100 MG tablet Take 100 mg by mouth daily      levETIRAcetam (KEPPRA) 750 MG tablet TAKE 1 TABLET BY MOUTH TWO  TIMES DAILY 180 tablet 3    metFORMIN (GLUCOPHAGE) 1000 MG tablet Take 1,000 mg by mouth 2 times daily (with meals)      pioglitazone (ACTOS) 15 MG tablet Take 15 mg by mouth daily      fluticasone (FLONASE) 50 MCG/ACT nasal spray 1 spray by Each Nostril route daily 1 Bottle 0    atorvastatin (LIPITOR) 20 MG tablet Take 1 tablet by mouth daily 90 tablet 1    acetaminophen (TYLENOL) 500 MG tablet Take 500 mg by mouth every 6 hours as needed for Pain      Blood Glucose Monitoring Suppl (BLOOD GLUCOSE METER) KIT RELI-ON METER. Use as directed 1 kit 0    Glucose Blood (BLOOD GLUCOSE TEST STRIPS) STRP RELI-ON STRIPS + LANCETS. Use to test blood sugar 4 times per day. 150 strip 5    Menthol-Camphor (TIGER BALM EXTRA STRENGTH) 11-10 % OINT Follow package directions for topical use (Patient not taking: Reported on 4/25/2022)  0    Magnesium Sulfate, Laxative, (EPSOM SALT) GRAN Follow package directions for warm water soaks (Patient not taking: Reported on 4/25/2022)  0    glipiZIDE (GLUCOTROL) 5 MG tablet Take 0.5 tablets by mouth daily. (Patient not taking: Reported on 4/25/2022) 45 tablet 1     No current facility-administered medications on file prior to visit.           PE:   Vitals:    04/25/22 1033   BP: 102/78   Pulse: 83   Resp: 16   SpO2: 98%   Weight: 193 lb 3.2 oz (87.6 kg)   Height: 6' 2\" (1.88 m)      General Appearance:  awake, alert, oriented, in no acute distress, he is wearing a mask. Gen: NAD, Language is Intact. Skin: no rash, lesion, dry  to touch. warm  Head: no rash, no icterus  Neck: There is no carotid bruits. The Neck is supple. There is no neck lymphadenopathy. Neuro: CN 2-12 grossly intact with no focal deficits. Power 5/5 Throughout symmetric, Reflexes are  symmetric. Long tracts are intact. Cerebellar exam is Intact. Sensory exam is intact to light touch. Gait is intact. Musculoskeletal:  Has no hand arthritis, no limitation of ROM in any of the four extremities. Lower extremities no edema  The abdomen is soft,  intact bowel sounds. DATA:  Results for orders placed or performed in visit on 04/16/22   CBC   Result Value Ref Range    WBC 6.4 4.0 - 11.0 X10E9/L    RBC 5.11 4.10 - 5.70 X10E12/L    Hemoglobin 14.7 13.0 - 17.0 g/dL    Hematocrit 43.9 39 - 49 %    MCV 86 80 - 100 fL    MCH 28.8 27 - 34 pg    MCHC 33.5 32 - 36 g/dL    RDW 12.6 11.5 - 15.0 %    Platelets 831 200 - 012 X10E9/L    MPV 8.2 7 - 12 fL    Neutrophils % 46.2 %    Absolute Neut # 3.0 1.5 - 6.6 X10E9/L    Lymphocyte % 40.4 %    Absolute Lymph # 2.6 1.0 - 3.5 X10E9/L    Monocytes 6.5 %    Absolute Mono # 0.4 0 - 0.9 X10E9/L    Eosinophils % 5.8 %    Absolute Eos # 0.4 0.0 - 0.4 X10E9/L    Basophils % 1.1 %    Absolute Baso # 0.1 0.0 - 0.2 F20I1/I   Basic Metabolic Panel   Result Value Ref Range    Glucose 195 (H) 65 - 99 mg/dL    BUN 13 5 - 23 mg/dL    CREATININE 0.82 0.60 - 1.30 mg/dL    eGFR African American >60 >59 ml/min/1.73sq.m    EGFR IF NonAfrican American >60 >59 ml/min/1.73sq. m    Calcium 9.6 8.5 - 10.5 mg/dL    Sodium 136 134 - 146 mmol/L    Potassium 4.1 3.5 - 5.0 mmol/L    Chloride 99 98 - 109 mmol/L    CO2 30 22 - 32 mmol/L    Anion Gap 7 5 - 15 mmol/L           Assessment:     Diagnosis Orders   1. Seizure disorder [facility])          He denies any seizure or episodes of confusion. He is on Keppra 750 mg twice a day. no reported seizure.  He does not miss taking his seizure medication. His blood sugar is not well controlled. HbA1c=10 most recently. His exam is non focal. CBC and BMP were satisfactory. After detailed discussion with patient and his mother we agreed on the following plan. Plan:  1. Continue with Keppra 750 mg twice a day. Refills given. 2. CBC, BMP, Keppra level. 3.  Follow up in 12 months or sooner if needed. 4. Call if any questions or concerns. Please call if any questions.      Time spent 24 min     Veleta Dance, MD MD

## 2022-04-25 NOTE — PATIENT INSTRUCTIONS
1.   Continue with Keppra 750 mg twice a day. Refills given. 2. CBC, BMP, Keppra level. 3.  Follow up in 12 months or sooner if needed. 4. Call if any questions or concerns.

## 2022-04-28 ENCOUNTER — TELEPHONE (OUTPATIENT)
Dept: NEUROLOGY | Age: 42
End: 2022-04-28

## 2022-04-28 DIAGNOSIS — G40.909 SEIZURE DISORDER (HCC): Primary | ICD-10-CM

## 2022-04-28 LAB — KEPPRA: 5.3 MCG/ML (ref 12–46)

## 2022-04-28 NOTE — RESULT ENCOUNTER NOTE
Please ask patient if taking the Keppra Correctly, the level is low =5.3, please verify dosage. May need repeated on adjust dosage.    Jonathon Ji MD

## 2022-04-28 NOTE — TELEPHONE ENCOUNTER
----- Message from Edvin Courtney MD sent at 4/28/2022 11:54 AM EDT -----  Please ask patient if taking the Keppra Correctly, the level is low =5.3, please verify dosage. May need repeated on adjust dosage.    Edvin Courtney MD

## 2022-05-20 LAB — KEPPRA: 29 MCG/ML (ref 12–46)

## 2022-06-23 LAB
AVERAGE GLUCOSE: NORMAL
HBA1C MFR BLD: 8.9 %

## 2022-08-18 ENCOUNTER — OFFICE VISIT (OUTPATIENT)
Dept: INTERNAL MEDICINE CLINIC | Age: 42
End: 2022-08-18

## 2022-08-18 VITALS
DIASTOLIC BLOOD PRESSURE: 79 MMHG | WEIGHT: 196.8 LBS | TEMPERATURE: 98.1 F | SYSTOLIC BLOOD PRESSURE: 117 MMHG | HEART RATE: 68 BPM | HEIGHT: 74 IN | BODY MASS INDEX: 25.26 KG/M2

## 2022-08-18 DIAGNOSIS — E11.9 TYPE 2 DIABETES MELLITUS WITHOUT COMPLICATION, WITHOUT LONG-TERM CURRENT USE OF INSULIN (HCC): Primary | ICD-10-CM

## 2022-08-18 RX ORDER — EZETIMIBE 10 MG/1
10 TABLET ORAL DAILY
COMMUNITY
Start: 2022-03-21

## 2022-08-18 NOTE — PROGRESS NOTES
Diabetes Mellitus Type II, Initial Visit:   Dr. Lennox Blase  Patient here for an initial evaluation of Type 2 diabetes mellitus. Current symptoms/problems include none. The patient was initially diagnosed with Type 2 diabetes mellitus 2010. Known diabetic complications: none  Cardiovascular risk factors: none  Current diabetic medications include  Actos 15mg daily . Januvia 100mg,Metfromin 1000mg BID  Eye exam current (within one year): no; 2 years ago. [facility]  Weight trend: increasing steadily; few pounds in the past several years; down 4 pounds in past week  Prior visit with dietician: no  Current diet: B up 11am no bkfst                       L 1-2p chicken nugget (20pc); sw tea                       D 7:30 @ work. Chips; gatorade 0                       2am 1 cup cereal                       Weekends --snacks more                       Drinks sweet tea, gatorade 0; reg  pepper [place] water  Current exercise: walks a lot at  work. 6,000 steps per phone                  ADL's- moderate activity  Current monitoring regimen: home blood tests - not testing  Home blood sugar records: has not tested for 3 months  Blood sugar today fasting 186  Any episodes of hypoglycemia? no    Is He on ACE inhibitor or angiotensin II receptor blocker? No     Focus  Initial visit for Diabetes education. Recent A1C 8.9% from Dr. Luis Alfredo Dobson office. He is not checking blood sugars, although he has a meter/ strips. He presents with his mom who is answering most of the questions. Brennan make minimal eye contact, stating he is tired. Denies any issues with with depression. He is eating large amounts of carbs/ processed foods and drinking regular pop and sugared tea. He also drink SF beverages. He does not have a reason why he drinks sugared drinks or doesn't check his blood sugar. Mom reports that he doesn't like the needles. Discussed goals for maintaining health.  If covered by insurance, a Syed Taylor could be helpful for SGM. He can use his phone as a , if covered. Follow up 6 weeks with RD    Plan  Reviewed physiology of Diabetes, actions/ doses of medications being taken, SGM, preventing complications; carbs/ sugars drinks, exercise  We will check on the Freestyle Libre2 sensors     -we will ask Dr. Jackeline Wellington if he will send in a script for sensors        You can use your phone for your . will have to evaluate the cost    * you have to swipe your blood sugar at least every 8 hours every                Day to get blood sugar results  Get rid of any beverages that have sugar in them      --enjoy your cirkul water, gatarade 0; Dr. Sigrid Trinh 0  Stay as active as possible. Maybe take walks on your days off. You can ask about Rybelsus --the pill form of Trulicity  Meter download, medications, PMH and nursing assessment reviewed. Vielka Valdez states He is willing to participate, with limitations, in this plan of care and verbalized understanding of all instructions provided. Teach back used to verify comprehension. Total time involved in direct patient education: 60 minutes. Dr. Thierry Weiner,     Please consider ordering the Freestyle [place] 2 for [employer]. His phone is compatible as a reader and it would be worth seeing if his insurance would cover the sensors. [facility], [place].

## 2022-08-18 NOTE — PATIENT INSTRUCTIONS
We will check on the Freestyle Libre2 sensors     -we will ask Dr. Wilfredo Bowden if he will send in a script for sensors        You can use your phone for your . will have to evaluate the cost    * you have to swipe your blood sugar at least every 8 hours every                Day to get blood sugar results  Get rid of any beverages that have sugar in them      --enjoy your cirkul water, gatarade 0; Dr. Jenny Patel 0  Stay as active as possible. Maybe take walks on your days off.   You can ask about Rybelsus --the pill form of Trulicity

## 2022-09-11 LAB
ALBUMIN SERPL-MCNC: 4.5 G/DL (ref 3.5–5.2)
ALK PHOSPHATASE: 154 U/L (ref 40–117)
ALT SERPL-CCNC: 167 U/L (ref 5–50)
AMYLASE: 51 U/L (ref 28–100)
AST SERPL-CCNC: 75 U/L (ref 9–50)
BILIRUB SERPL-MCNC: 0.3 MG/DL
BILIRUBIN DIRECT: <0.2 MG/DL (ref 0–0.3)
LIPASE: 37 U/L (ref 13–60)
TOTAL PROTEIN: 7.5 G/DL (ref 6.1–8.3)

## 2022-09-12 LAB
CERULOPLASMIN: 24.8 MG/DL (ref 15–30)
MITOCHONDRIAL M2 AB, IGG: 8.3 UNITS

## 2022-09-14 ENCOUNTER — TRANSCRIBE ORDERS (OUTPATIENT)
Dept: ADMINISTRATIVE | Age: 42
End: 2022-09-14

## 2022-09-14 DIAGNOSIS — R79.89 LFT ELEVATION: Primary | ICD-10-CM

## 2022-09-23 ENCOUNTER — HOSPITAL ENCOUNTER (OUTPATIENT)
Dept: ULTRASOUND IMAGING | Age: 42
Discharge: HOME | End: 2022-09-23
Payer: COMMERCIAL

## 2022-09-23 DIAGNOSIS — R79.89 LFT ELEVATION: ICD-10-CM

## 2022-09-23 PROCEDURE — 76705 ECHO EXAM OF ABDOMEN: CPT

## 2022-09-24 LAB — FERRITIN: 103 NG/ML (ref 30–400)

## 2022-09-27 LAB
ANA PATTERN: NORMAL
ANTI-NUCLEAR ANTIBODY (ANA): NEGATIVE
CENTROMERE PATTERN: NEGATIVE TITER
COARSE SPECKLED PATTERN: NEGATIVE TITER
COMMENTS: NORMAL
CYTO RETICULAR (MITO) PATTERN: NEGATIVE
DENSE FINE SPECKLED PATTERN: NEGATIVE TITER
DISCRETE NUCLEAR DOTS PATTERN: NEGATIVE TITER
HOMOGENEOUS PATTERN: NEGATIVE TITER
METHOD: NORMAL
NUCLEAR MEMBRANE PATTERN: NEGATIVE TITER
NUCLEOLAR PATTERN: NEGATIVE TITER
SPECKLED PATTERN: NEGATIVE TITER

## 2022-10-13 LAB
AVERAGE GLUCOSE: NORMAL
HBA1C MFR BLD: 8.6 %

## 2022-11-30 ENCOUNTER — OFFICE VISIT (OUTPATIENT)
Dept: INTERNAL MEDICINE CLINIC | Age: 42
End: 2022-11-30
Payer: COMMERCIAL

## 2022-11-30 VITALS
WEIGHT: 201.2 LBS | HEART RATE: 80 BPM | HEIGHT: 74 IN | TEMPERATURE: 97.9 F | BODY MASS INDEX: 25.82 KG/M2 | DIASTOLIC BLOOD PRESSURE: 81 MMHG | SYSTOLIC BLOOD PRESSURE: 121 MMHG

## 2022-11-30 DIAGNOSIS — E11.9 TYPE 2 DIABETES MELLITUS WITHOUT COMPLICATION, WITHOUT LONG-TERM CURRENT USE OF INSULIN (HCC): Primary | ICD-10-CM

## 2022-11-30 PROCEDURE — G0108 DIAB MANAGE TRN  PER INDIV: HCPCS | Performed by: INTERNAL MEDICINE

## 2022-11-30 RX ORDER — ORAL SEMAGLUTIDE 7 MG/1
7 TABLET ORAL DAILY
COMMUNITY
Start: 2022-11-07

## 2022-11-30 ASSESSMENT — PATIENT HEALTH QUESTIONNAIRE - PHQ9
SUM OF ALL RESPONSES TO PHQ QUESTIONS 1-9: 0
1. LITTLE INTEREST OR PLEASURE IN DOING THINGS: 0
SUM OF ALL RESPONSES TO PHQ9 QUESTIONS 1 & 2: 0
2. FEELING DOWN, DEPRESSED OR HOPELESS: 0
SUM OF ALL RESPONSES TO PHQ QUESTIONS 1-9: 0

## 2022-11-30 NOTE — PROGRESS NOTES
[facility]  [address] [address] [address] [facility] [address]  [phone] (phone)  [phone] (fax)    Patient ID: Gurinder Ngo [id number]  Referring Provider: Dr. Radha Crockett     Patient's name and  were verified. Subjective:    He presents for His follow-up diabetic visit. He has type 2 diabetes mellitus. Home regimen includes: Biguanide, TZD, DDP-IV-1, and GLP-1 Agonist He is noncompliant much of the time.      Assessment:     Lab Results   Component Value Date/Time    LABA1C 8.9 2022 12:00 AM    BUN 13 2022 10:34 AM    CREATININE 0.82 2022 10:34 AM     Vitals:    22 1822   BP: 121/81   Site: Right Upper Arm   Position: Sitting   Pulse: 80   Temp: 97.9 °F (36.6 °C)   Weight: 201 lb 3.2 oz (91.3 kg)   Height: 6' 2\" (1.88 m)     Wt Readings from Last 3 Encounters:   22 201 lb 3.2 oz (91.3 kg)   22 196 lb 12.8 oz (89.3 kg)   22 193 lb 3.2 oz (87.6 kg)     Ht Readings from Last 3 Encounters:   22 6' 2\" (1.88 m)   22 6' 2\" (1.88 m)   22 6' 2\" (1.88 m)       Diabetes Pharmacotherapy:  Metfromin 1000mg BID  Actos 15 mg daily  Rybelsus 7mg daily    Glucose Trends:   Glucose at 5 hrs PPD today resulted at 284mg/dl  Current monitoring regimen: Fingerstick blood tests - none times daily  Home blood sugar trends:    -Fasting AM: Brittney Grain is not testing his blood sugars  Any episodes of hypoglycemia? no   -Treats with na    Lifestyle Factors:   Previous visit with dietician: no  Current diet: B: up5am                          6am crustable            9am bag chips/ milk                       L: 11am beef goulash--vending machine                       D: 5-6pm bagel bites 10                       Bedtime 8-9pm                       Snacks:just the morning                       Beverages: 8 oz ccup 8-9 per day; milk 2 cartons per day  Current exercise: constantly walking at work; 5 days per week; 12 hour days    Health Maintenance:  Eye exam current (within one year): yes Date: 10/22/2022, Terri Avila  Any history of foot problems? no  Foot exam up to date: yes Date: 6/23/22  Immunizations up to date:    Pneumonia: no   Influenza: no  The 10-year ASCVD risk score (Santo MCCABE, et al., 2019) is: 3.7%    Values used to calculate the score:      Age: 40 years      Sex: Male      Is Non- : No      Diabetic: Yes      Tobacco smoker: No      Systolic Blood Pressure: 654 mmHg      Is BP treated: No      HDL Cholesterol: 43 mg/dL      Total Cholesterol: 235 mg/dL   Last panel - LDL:   Lab Results   Component Value Date    LDLCALC 156 (H) 06/18/2022     Taking ASA:  No   Taking statin: No - Not identified  Last microalbumin/creatinine ratio:   Microalbumin Creatinine Ratio   Date Value Ref Range Status   02/28/2015 4 ug/mg Final     Comment:              NORMAL       0-13  ug/mg                     BORDERLINE   14-30 ug/mg            ABNORMAL     >30   ug/mg                      Taking ACE/ARB: No - not identified  Depression screening completed. 11/30/2022  Score 0    Focus:   Diabetes Education. Last A1C: 8.6% 11/30/22. Jodie present with his mom. He is not checking his blood sugars. He has been trying the get the Polanco, but is having to get a prior Jerrald Venkat. He needs tocheck his blood sugars at least once daily until he can possibly get the Polanco. Much reinforcement given for the need for glucose checks. Maria De Jesus Rios is more interactive than previous visits. But he is not engaged in his Diabetes Care. His only answer is \"I hate needles\". He is taking his medication using a pill box. His Rybelsus is included in the pill box. But should be stored in its original container. Mom states he will not take it, if  from his other pills. Discussion about what engages Brennan and about preventing complications. We cannot seem to find a way to reach/ engage him.  Mom will continue to provide support, but since he lives alone, she can't be sure what he is doing on a daily

## 2022-11-30 NOTE — PATIENT INSTRUCTIONS
Stick with a meal plan               3 meals per day. 3-4 servings of carbohydrate               No mor than 2-3 snacks per day. 1 serving carbohydrate     -try to prepare food ahead. Avoid eating more processed food.                  [place] meats, make pots of soup to have through the week  Check your blood sugar once every day--waking up OR                       2 hours after you eat a meal                  Goal before meals                    2hours after a meal under 160  Stay active--especially on your days off  Stay hydrated--water or sugar free beverages

## 2022-12-07 ENCOUNTER — OFFICE VISIT (OUTPATIENT)
Dept: INTERNAL MEDICINE CLINIC | Age: 42
End: 2022-12-07
Payer: COMMERCIAL

## 2022-12-07 VITALS — WEIGHT: 203.8 LBS | BODY MASS INDEX: 26.15 KG/M2 | HEIGHT: 74 IN

## 2022-12-07 DIAGNOSIS — E11.9 TYPE 2 DIABETES MELLITUS WITHOUT COMPLICATION, WITHOUT LONG-TERM CURRENT USE OF INSULIN (HCC): Primary | ICD-10-CM

## 2022-12-07 PROCEDURE — 97802 MEDICAL NUTRITION INDIV IN: CPT | Performed by: DIETITIAN, REGISTERED

## 2022-12-07 NOTE — PROGRESS NOTES
roll this morning. Current diet: B: up @ 5am to work by 650 am.                        6am crustable OR cinnamon roll                        9am bag chips/ milk                       L: 11 am beef goulash--vending machine OR packs lunch - frozen meal - ex. Michellines or Banquet - chicken and rice OR occ mom's leftovers OR Pt makes homemade chicken and noodle soup or makes [place] breast and rice or lemon pepper chic bresat with rice. D: 5-6pm, 10 bagel bites OR Last night ate ~ 730 pm - Ramen noodles - a big square of this. Bedtime 8-9pm                       Snacks:just the morning OR chips OR occ sweets - 2 oreo mint cookies with tall glass of milk. Will usually drink 2 tall glasses of milk each day - in the morning and with supper. Beverages: water - plain 8 oz cup 8-9 per day; milk 2 cartons per day  Eat out - 3x/week. Current exercise: constantly walking at work; 5 days per week; 12 hour days    -Impression of Dietary Intake: on average, 1-3 meals per day, on average, 3 fast food meals per week, high fat/ cholesterol, lacking routine intake of fruits and vegetables, frequent processed carb choices and canned fruit instead of fresh     Current Outpatient Medications on File Prior to Visit   Medication Sig Dispense Refill    RYBELSUS 7 MG TABS Take 7 mg by mouth daily      Glucose Blood (BLOOD GLUCOSE TEST VI) by In Vitro route Test 2 times per day.  Reli On meter      ezetimibe (ZETIA) 10 MG tablet Take 10 mg by mouth daily      levETIRAcetam (KEPPRA) 750 MG tablet TAKE 1 TABLET BY MOUTH TWO  TIMES DAILY 180 tablet 3    SITagliptin (JANUVIA) 100 MG tablet Take 100 mg by mouth daily      metFORMIN (GLUCOPHAGE) 1000 MG tablet Take 1,000 mg by mouth 2 times daily (with meals)      pioglitazone (ACTOS) 15 MG tablet Take 15 mg by mouth daily      acetaminophen (TYLENOL) 500 MG tablet Take 500 mg by mouth every 6 hours as needed for Pain      Blood Glucose Monitoring Suppl (BLOOD GLUCOSE METER) KIT RELI-ON METER. Use as directed 1 kit 0     No current facility-administered medications on file prior to visit. Vitals from current and previous visits:  Ht 6' 2\" (1.88 m)   Wt 203 lb 12.8 oz (92.4 kg)   BMI 26.17 kg/m²     -Body mass index is 26.17 kg/m². 25-29.9 - Overweight.   -Weight goal: lose weight. Nutrition Diagnosis:   Food and nutrition-related knowledge deficit related to Lack of previous MNT/currently undergoing MNT as evidenced by Conditions associated with a diagnosis or treatment: Type 2 DB. Intervention:  -Impression: pt was sleepy and yawning frequently during session. After working all day and poor nutritional intake today.    -Instructed the patient on: Carbohydrate Counting, Consistent Carbohydrate Intake, Food Label Reading, and Meal Planning for Regular, Balanced Meals & Snacks    -Handouts given for: carbohydrate counting, food logging, healthy snacks, 200 gm sample meal plans/menus, and brkf ideas x3, lunch ideas x1. Patient Instructions   Bring a 1-2 week food log and meter to next dietitian appt. Thanks. Check BS:  2-3x/day  Fasting BS (1st thing in the morning) or before a meal (at least 4 hour since last ate), BS goal:  90 - 130  2 hours after the start of a meal, BS goal:  100 - 150     Drink 1 cup milk at a time - avoid tall glasses of milk at one time. 1.)  Get familiar with reading the nutrition facts label by looking at serving size and total carbohydrates. - Without a label refer to your carb count guide booklet. 2.)  Measure foods for accuracy in carb counting.    3.)  Healthy carb choices:  whole grains, whole wheat pasta, starchy vegetables, fresh fruit and lowfat/non-fat milk and yogurt.     4.)  Your meal plan is found on the inside cover of your carb counting guide booklet:  1st meal or Brkf:  60 gms carbohydrates + 1-2 oz protein  2nd meal or Lunch: 60 gms carbohydrates + 2-3 oz protein + non-starchy veggies  3rd meal or Dinner:  60 gms carbohydrates + 2-3 oz protein + non- starchy veggies    Snack time:  15 - 25 gms carbohydrates + 1 oz protein    5.)  Choose lean protein most of the time and Cut in 1/2 added fats to help with weight loss efforts.    -Nutrition prescription: 2000 calories/day, 200 - 225 g carbs/day. Comprehension verified using teachback method. Monitoring/Evaluation:   -Followup visit: 5 weeks with dietitian.   -Receptiveness to education/goals: Agreeable.  -Evaluation of education: Needs reinforcement.  -Readiness to change: precontemplative- implementing carb counting and balanced meal planning.  -Expected compliance: fair. Thank you for your referral of this patient. Total time involved in direct patient education: 45 minutes for initial MNT visit.

## 2022-12-07 NOTE — PATIENT INSTRUCTIONS
Bring a 1-2 week food log and meter to next dietitian appt. Thanks. Check BS:  2-3x/day  Fasting BS (1st thing in the morning) or before a meal (at least 4 hour since last ate), BS goal:  90 - 130  2 hours after the start of a meal, BS goal:  100 - 150     Drink 1 cup milk at a time - avoid tall glasses of milk at one time. 1.)  Get familiar with reading the nutrition facts label by looking at serving size and total carbohydrates. - Without a label refer to your carb count guide booklet. 2.)  Measure foods for accuracy in carb counting.    3.)  Healthy carb choices:  whole grains, whole wheat pasta, starchy vegetables, fresh fruit and lowfat/non-fat milk and yogurt. 4.)  Your meal plan is found on the inside cover of your carb counting guide booklet:  1st meal or Brkf:  60 gms carbohydrates + 1-2 oz protein  2nd meal or Lunch: 60 gms carbohydrates + 2-3 oz protein + non-starchy veggies  3rd meal or Dinner:  60 gms carbohydrates + 2-3 oz protein + non- starchy veggies    Snack time:  15 - 25 gms carbohydrates + 1 oz protein    5.)  Choose lean protein most of the time and Cut in 1/2 added fats to help with weight loss efforts.

## 2023-01-30 ENCOUNTER — OFFICE VISIT (OUTPATIENT)
Dept: INTERNAL MEDICINE CLINIC | Age: 43
End: 2023-01-30
Payer: COMMERCIAL

## 2023-01-30 VITALS — WEIGHT: 198 LBS | BODY MASS INDEX: 25.41 KG/M2 | HEIGHT: 74 IN

## 2023-01-30 DIAGNOSIS — E11.65 UNCONTROLLED TYPE 2 DIABETES MELLITUS WITH HYPERGLYCEMIA (HCC): Primary | ICD-10-CM

## 2023-01-30 PROCEDURE — 97803 MED NUTRITION INDIV SUBSEQ: CPT | Performed by: DIETITIAN, REGISTERED

## 2023-01-30 RX ORDER — GLIMEPIRIDE 4 MG/1
4 TABLET ORAL
COMMUNITY

## 2023-01-30 NOTE — PROGRESS NOTES
[address] [place] [address]. [address] [place] [address] Geovani., [facility], [facility]  [phone] (phone)  [phone] (fax)    Patient Name: Polina Louise. Date of Birth: [id number]. MRN: [id number]      Assessment: Patient is a 40 y.o. male seen for follow-up MNT visit for Type 2 DB.     -Nutritionally relevant labs:   Lab Results   Component Value Date/Time    LABA1C 11.2 (H) 12/31/2022 09:10 AM    LABA1C 8.6 10/13/2022 12:00 AM    LABA1C 8.9 06/23/2022 12:00 AM    GLUCOSE 254 (H) 12/31/2022 09:10 AM    GLUCOSE 195 (H) 04/16/2022 10:34 AM    CHOL 298 (H) 12/31/2022 09:10 AM    HDL 49 12/31/2022 09:10 AM    LDLCALC 212 (H) 12/31/2022 09:10 AM    TRIG 184 (H) 12/31/2022 09:10 AM     DB meds:  Metformin 1000 mg BID  Amaryl 4 mg daily (actos discontinued by provider)  Januvia 100 mg daily  Rybelsus increased to 7 mg daily.    -Blood sugar trends: does not check his BS. In office BS check 287 - got up in the middle of his sleeping to come to the appt. Recently started working nights instead of evenings. 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 Working FT maintenance    -Food recall:   Up ~ 9m - needs to take Ryblesus 1 hour before eating.   Takes pills with 1 cup milk and nothing else or with chicken nuggets, HiC drink or with crackers and cheese and donut  Meal at Work (230 am) - chinese meal and 2 crab ragoons OR sausage biscuit and gravy, sunkist orange drink and milk OR mac and cheese and Dr pepper OR shredded chicken sandwich and Dr pepper OR slice of pizza and pop OR Ham & augratin potatoes, pop  830 - 930 a, skips usually and goes right to bed OR leftover chineses OR steak, steak fries, fried apple, 3 large glasses of Dr Karen Andrew (Cracker barrel meal)    Other supper meal at home on day off - Beef and noodles and mashed poatoes, peas, 1 biscuit and salad    -Main Beverages: water and pop.    -Impression of Dietary Intake: in general, an \"unhealthy\" diet, on average, 1-3 meals per day, on average, 1 fast food meals per week, lacking routine intake of fruits and vegetables, high pop consumption    Current Outpatient Medications on File Prior to Visit   Medication Sig Dispense Refill    glimepiride (AMARYL) 4 MG tablet Take 4 mg by mouth every morning (before breakfast)      RYBELSUS 7 MG TABS Take 14 mg by mouth daily Per provider      Glucose Blood (BLOOD GLUCOSE TEST VI) by In Vitro route Test 2 times per day. Reli On meter      ezetimibe (ZETIA) 10 MG tablet Take 10 mg by mouth daily      levETIRAcetam (KEPPRA) 750 MG tablet TAKE 1 TABLET BY MOUTH TWO  TIMES DAILY 180 tablet 3    SITagliptin (JANUVIA) 100 MG tablet Take 100 mg by mouth daily      metFORMIN (GLUCOPHAGE) 1000 MG tablet Take 1,000 mg by mouth 2 times daily (with meals)      acetaminophen (TYLENOL) 500 MG tablet Take 500 mg by mouth every 6 hours as needed for Pain      Blood Glucose Monitoring Suppl (BLOOD GLUCOSE METER) KIT RELI-ON METER. Use as directed 1 kit 0    pioglitazone (ACTOS) 15 MG tablet Take 15 mg by mouth daily (Patient not taking: Reported on 1/30/2023)       No current facility-administered medications on file prior to visit. Vitals from current and previous visits:  Ht 6' 2\" (1.88 m)   Wt 198 lb (89.8 kg)   BMI 25.42 kg/m²     -Body mass index is 25.42 kg/m². 25-29.9 - Overweight. - Patient lost and 6 pounds over the last 7 weeks.  -Weight goal: maintain weight. Nutrition Diagnosis:   Limited adherence to nutrition-related recommendations related to lack of value or competing values for behavior change and currently undergoing MNT as evidenced by Food recall and Hgb AIC 11.2%         Intervention:  -Impression: Pt does not acknowledge the seriousness of his un-managed diabetes. -Instructed the patient on: Carbohydrate Counting, Consistent Carbohydrate Intake, and Meal Planning for Regular, Balanced Meals & Snacks, Why take care of your diabetes    -Handouts given for: food logging.     Patient Instructions   Avoid regular pop and drink water most of the time. Try Zero sugar drinks. - Cirkel is a great option too. Have your 1st meal of the day, when working nights, at home. @ 9 pm.  Have your 2nd meal of the day, 2-3 am, when working nights. Take time out to have a mini-meal at least at your last break at work. - have a healthy snack before going to bed. Pack 2 meals for working 12 hour shifts. Your meal plan ~ 60 - 75 gms carbs (= 4-5 carb servings)  - protein  - add veggies    When doing a 1-2 week food log - include measurements!!    Get in the habit of checking your BS before going into work and then when home from work. -Nutrition prescription: 2000 calories/day, 200 - 225 g carbs/day. Comprehension verified using teachback method. Monitoring/Evaluation:   -Followup visit: 8 weeks with dietitian.   -Receptiveness to education/goals: Agreeable.  -Evaluation of education: Needs reinforcement.  -Readiness to change: precontemplative- eating meal at home before leaving for work each night, cutting back on regular pop, checking BS 2x/day  -Expected compliance: fair. Thank you for your referral of this patient. Total time involved in direct patient education: 30 minutes for follow-up MNT visit.

## 2023-01-30 NOTE — PATIENT INSTRUCTIONS
Avoid regular pop and drink water most of the time. Try Zero sugar drinks. - Cirkel is a great option too. Have your 1st meal of the day, when working nights, at home. @ 9 pm.  Have your 2nd meal of the day, 2-3 am, when working nights. Take time out to have a mini-meal at least at your last break at work. - have a healthy snack before going to bed. Pack 2 meals for working 12 hour shifts. Your meal plan ~ 60 - 75 gms carbs (= 4-5 carb servings)  - protein  - add veggies    When doing a 1-2 week food log - include measurements!!    Get in the habit of checking your BS before going into work and then when home from work.

## 2023-02-16 DIAGNOSIS — G40.909 SEIZURE DISORDER (HCC): Primary | ICD-10-CM

## 2023-02-16 RX ORDER — LEVETIRACETAM 750 MG/1
TABLET ORAL
Qty: 180 TABLET | Refills: 1 | Status: SHIPPED | OUTPATIENT
Start: 2023-02-16

## 2023-02-28 ENCOUNTER — PHARMACY VISIT (OUTPATIENT)
Dept: INTERNAL MEDICINE CLINIC | Age: 43
End: 2023-02-28
Payer: COMMERCIAL

## 2023-02-28 VITALS
HEART RATE: 83 BPM | DIASTOLIC BLOOD PRESSURE: 80 MMHG | WEIGHT: 203 LBS | TEMPERATURE: 97.4 F | BODY MASS INDEX: 26.06 KG/M2 | SYSTOLIC BLOOD PRESSURE: 134 MMHG

## 2023-02-28 DIAGNOSIS — E11.9 TYPE 2 DIABETES MELLITUS WITHOUT COMPLICATION, WITHOUT LONG-TERM CURRENT USE OF INSULIN (HCC): Primary | ICD-10-CM

## 2023-02-28 PROCEDURE — G0108 DIAB MANAGE TRN  PER INDIV: HCPCS | Performed by: INTERNAL MEDICINE

## 2023-02-28 RX ORDER — ORAL SEMAGLUTIDE 14 MG/1
1 TABLET ORAL DAILY
COMMUNITY

## 2023-02-28 NOTE — PROGRESS NOTES
[facility]  [address] [address] [address] [facility], [address]  [phone] (phone)  [phone] (fax)    Patient ID: Mahendra Sandoval 1980  Referring Provider: Dr. Keyonna Massey     Patient's name and  were verified. Subjective:    He presents for His follow-up diabetic visit. He has type 2 diabetes mellitus. Home regimen includes: Biguanide, Sulfonylurea, DDP-IV-1, and GLP-1 Agonist He is compliant a majority of the time.   Assessment:     Lab Results   Component Value Date/Time    LABA1C 11.2 2022 09:10 AM    BUN 16 2022 09:10 AM    CREATININE 0.68 2022 09:10 AM     Vitals:    23 1628   BP: 134/80   Pulse: 83   Temp: 97.4 °F (36.3 °C)   Weight: 203 lb (92.1 kg)     Wt Readings from Last 3 Encounters:   23 203 lb (92.1 kg)   23 198 lb (89.8 kg)   22 203 lb 12.8 oz (92.4 kg)     Ht Readings from Last 3 Encounters:   23 6' 2\" (1.88 m)   22 6' 2\" (1.88 m)   22 6' 2\" (1.88 m)       Diabetes Pharmacotherapy:  Glimepiride 4 mg every morning  Metformin 1000 mg BID with meals  Rybelsus 14 mg daily  Januvia 100 mg daily    Glucose Trends:   Glucose at 1.5 hrs PPD today resulted at 311 mg/dl  Current monitoring regimen: Fingerstick blood tests - 0 times daily  Home blood sugar trends: Patient not testing at home   Any episodes of hypoglycemia? no   -Counseled on appropriate management     Lifestyle Factors:   Previous visit with dietician: yes - 23  Current diet: B: usually skips - glass of milk, bag of chips             L: 4-6a - cheeseburger & fries, lasagna                       D: 7a - skips mostly, bowl of cereal (fruit loops) + 2% milk                       Snacks: chips, cade crackers                       Beverages: primarily sweet tea, 2% milk, water, regular soda (Dr. Jesus Johnson)   Working third shift 11pm - 7a  Current exercise: lots of walking and standing at work (third shift), 12 hour shifts 7 days a week    Health Maintenance:  Eye exam current (within one year): yes Date: 10/22/22, Chava Devi  Any history of foot problems? no  Foot exam up to date: yes Date: 6/23/22  Immunizations up to date:    Pneumonia: no   Influenza: no  The 10-year ASCVD risk score (Santo MCCABE, et al., 2019) is: 4.9%  Last panel - LDL:   Lab Results   Component Value Date    LDLCALC 212 (H) 12/31/2022     Taking ASA:  No   Taking statin: No - atorvastatin recently stopped by PCP, currently on Zetia  Last microalbumin/creatinine ratio:   Microalbumin Creatinine Ratio   Date Value Ref Range Status   02/28/2015 4 ug/mg Final     Comment:              NORMAL       0-13  ug/mg                     BORDERLINE   14-30 ug/mg            ABNORMAL     >30   ug/mg                      Taking ACE/ARB: No - not indicated  Depression screening completed. 11/30/22    Focus:   Diabetes Education. Last A1C: 11.2% (12/31/22)  Moon Anguiano presents to diabetes clinic today with his mom for diabetes follow up. He reports not checking his blood sugars at home and that the [facility] CGM was recently denied. He states his greatest barriers to testing is his dislike of needles and convenience as he works many third shift hours. His mother states they are working with Dr. Ramon Calle office on getting approval for Dexcom. Dexcom sample provided in office today and education provided. Encouraged patient to check blood glucose daily to help Dexcom approval. Discussed in depth with Brennan and his mother the complications of uncontrolled glucose levels. His mother continues to provide support by encouraging Brennan to test his blood sugar and filling his medication box weekly. Discussed cutting back on consumption of sugary drinks such as daily sweet tea and replacing with sugar free options. Plan for 2 week follow up to discuss Dexcom CGM results.      Curriculum Area Focus: Diabetes complications, Glucose checks/goals, Carbohydrate counting/meal planning, Hypoglycemia management, and Diabetes Technology  DSME PLAN: Try to limit sweet tea - consider sweet tea Jeffry drops    2. Dexcom CGM instructions completed. Dexcom CGM initiated abdomen (body site). 2 hour warm up period initiated. Urgent low warning set at 70  Urgent high warning set at 250  System is waterproof-do not need to do anything for showers. You can request \"overpatches\" from Dexcom to  help keep sensor in place for 10 days. Warm up period: 2 hours until your sensor will start recording blood sugar results  Watch the arrows --this arrow indicates if your blood sugars are trending up, trending down or    staying stable. If the [place] is red= indicates a sugar in low range                                                Elena Beau = indicates a sugar in range                                                Orange =indicates a sugar above goal range  Do not throw your transmitter away (the small gray piece)--this is used with each new sensor and is good for 3 months before needing replaced  Change sensor every 10 days  Contact Dexcom [phone] for assistance. Monday- Friday 5:30am-8pm PST    3. For low blood sugars:    -Treat with 15 grams of fast acting sugar or carbs      For high blood sugars:   -Drink a full glass of water, try to get 10-15 minutes of extra exercise       Goals Addressed                   This Visit's Progress     Check blood sugars using the Dexcom sample   On track     Limit drinking beverages with sugar in them (Sweet Tea)                Meter download, medications, PMH and nursing assessment reviewed. Nish Roman states He is willing to participate in this plan of care and verbalized understanding of all instructions provided. Teach back used to verify comprehension. Follow-up: 2 weeks PharmD, 1 month dietician    Total time involved in direct patient education: 60 minutes. Individual Education appointment justified due to: Class Availability    Patient case and plan reviewed with Vanessa Henriquez, AundreaD.     Fredy Pederson Sherley Kamara  2/28/2023 5:57 PM

## 2023-02-28 NOTE — PROGRESS NOTES
Patient case was reviewed with Yelitza Fernandez, AundreaD, PGY-1 Resident; I agree with the assessment/plan in Laney's note. I independently assessed the patient and provided additional education. Bertin Carpenter presents for initiation of Dexcom personal CGM. (Sample provided)  Referring provider: Dr. Mumtaz Kothari CGM instructions completed. Dexcom CGM initiated abdomen (body site). 2 hour warm up period initiated. Urgent low warning set at 70  Urgent high warning set at 250  System is waterproof-do not need to do anything for showers. You can request \"overpatches\" from Dexcom to  help keep sensor in place for 10 days. Warm up period: 2 hours until your sensor will start recording blood sugar results  Watch the arrows --this arrow indicates if your blood sugars are trending up, trending down or    staying stable. If the [place] is red= indicates a sugar in low range                                                Leanor Kand = indicates a sugar in range                                                Orange =indicates a sugar above goal range  Do not throw your transmitter away (the small gray piece)--this is used with each new sensor and is good for 3 months before needing replaced  Change sensor every 10 days  Contact Dexcom [phone] for assistance. Monday- Friday 5:30am-8pm PST      Total time involved in direct patient education: 60 minutes.      For Pharmacy Admin Tracking Only    Program: Medical Group  CPA in place:  No  Recommendation Provided To: Patient/Caregiver: 2 via In person  Intervention Detail: Device Training and Patient Access Assistance/Sample Provided  Intervention Accepted By: Patient/Caregiver: 2  Gap Closed?: No   Time Spent (min): 9182 Jorge Frank, PharmD, [facility]  Internal Medicine Clinical Pharmacist  [phone]

## 2023-02-28 NOTE — PATIENT INSTRUCTIONS
Try to limit sweet tea- consider sweet tea Jeffry drops    2. Dexcom CGM instructions completed. Dexcom CGM initiated abdomen (body site). 2 hour warm up period initiated. Urgent low warning set at 70  Urgent high warning set at 250  System is waterproof-do not need to do anything for showers. You can request \"overpatches\" from Dexcom to  help keep sensor in place for 10 days. Warm up period: 2 hours until your sensor will start recording blood sugar results  Watch the arrows --this arrow indicates if your blood sugars are trending up, trending down or    staying stable. If the [place] is red= indicates a sugar in low range                                                Bacilio Pepper = indicates a sugar in range                                                Orange =indicates a sugar above goal range  Do not throw your transmitter away (the small gray piece)--this is used with each new sensor and is good for 3 months before needing replaced  Change sensor every 10 days  Contact Dexcom [phone] for assistance. Monday- Friday 5:30am-8pm PST    3.  For low blood sugars:    -Treat with 15 grams of fast acting sugar or carbs      For high blood sugars:   -Drink a full glass of water, try to get 10-15 minutes of extra exercise

## 2023-03-12 ENCOUNTER — HOSPITAL ENCOUNTER (EMERGENCY)
Age: 43
Discharge: HOME | End: 2023-03-13
Payer: COMMERCIAL

## 2023-03-12 DIAGNOSIS — G40.909 SEIZURE DISORDER (HCC): ICD-10-CM

## 2023-03-12 DIAGNOSIS — R11.2 NAUSEA AND VOMITING, UNSPECIFIED VOMITING TYPE: ICD-10-CM

## 2023-03-12 DIAGNOSIS — M54.2 CERVICAL PAIN (NECK): ICD-10-CM

## 2023-03-12 DIAGNOSIS — R42 VERTIGO: ICD-10-CM

## 2023-03-12 DIAGNOSIS — E11.65 UNCONTROLLED TYPE 2 DIABETES MELLITUS WITH HYPERGLYCEMIA (HCC): Primary | ICD-10-CM

## 2023-03-12 LAB
ALBUMIN SERPL BCG-MCNC: 3.8 G/DL (ref 3.5–5.1)
ALP SERPL-CCNC: 128 U/L (ref 38–126)
ALT SERPL W/O P-5'-P-CCNC: 66 U/L (ref 11–66)
ANION GAP SERPL CALC-SCNC: 10 MEQ/L (ref 8–16)
AST SERPL-CCNC: 27 U/L (ref 5–40)
BASOPHILS ABSOLUTE: 0.1 THOU/MM3 (ref 0–0.1)
BASOPHILS NFR BLD AUTO: 0.9 %
BILIRUB SERPL-MCNC: 0.2 MG/DL (ref 0.3–1.2)
BUN SERPL-MCNC: 15 MG/DL (ref 7–22)
CALCIUM SERPL-MCNC: 9 MG/DL (ref 8.5–10.5)
CHLORIDE SERPL-SCNC: 100 MEQ/L (ref 98–111)
CO2 SERPL-SCNC: 24 MEQ/L (ref 23–33)
CREAT SERPL-MCNC: 0.7 MG/DL (ref 0.4–1.2)
DEPRECATED RDW RBC AUTO: 37.8 FL (ref 35–45)
EOSINOPHIL NFR BLD AUTO: 5.9 %
EOSINOPHILS ABSOLUTE: 0.4 THOU/MM3 (ref 0–0.4)
ERYTHROCYTE [DISTWIDTH] IN BLOOD BY AUTOMATED COUNT: 11.7 % (ref 11.5–14.5)
GFR SERPL CREATININE-BSD FRML MDRD: > 60 ML/MIN/1.73M2
GLUCOSE SERPL-MCNC: 299 MG/DL (ref 70–108)
HCT VFR BLD AUTO: 42.2 % (ref 42–52)
HGB BLD-MCNC: 13.9 GM/DL (ref 14–18)
IMM GRANULOCYTES # BLD AUTO: 0.02 THOU/MM3 (ref 0–0.07)
IMM GRANULOCYTES NFR BLD AUTO: 0.3 %
LYMPHOCYTES ABSOLUTE: 1.9 THOU/MM3 (ref 1–4.8)
LYMPHOCYTES NFR BLD AUTO: 27.5 %
MCH RBC QN AUTO: 28.8 PG (ref 26–33)
MCHC RBC AUTO-ENTMCNC: 32.9 GM/DL (ref 32.2–35.5)
MCV RBC AUTO: 87.6 FL (ref 80–94)
MONOCYTES ABSOLUTE: 0.4 THOU/MM3 (ref 0.4–1.3)
MONOCYTES NFR BLD AUTO: 6.1 %
NEUTROPHILS NFR BLD AUTO: 59.3 %
NRBC BLD AUTO-RTO: 0 /100 WBC
OSMOLALITY SERPL CALC.SUM OF ELEC: 280.2 MOSMOL/KG (ref 275–300)
PLATELET # BLD AUTO: 264 THOU/MM3 (ref 130–400)
PMV BLD AUTO: 9.8 FL (ref 9.4–12.4)
POTASSIUM SERPL-SCNC: 4.4 MEQ/L (ref 3.5–5.2)
PROT SERPL-MCNC: 7.1 G/DL (ref 6.1–8)
RBC # BLD AUTO: 4.82 MILL/MM3 (ref 4.7–6.1)
SEGMENTED NEUTROPHILS ABSOLUTE COUNT: 4.1 THOU/MM3 (ref 1.8–7.7)
SODIUM SERPL-SCNC: 134 MEQ/L (ref 135–145)
WBC # BLD AUTO: 6.9 THOU/MM3 (ref 4.8–10.8)

## 2023-03-12 PROCEDURE — 93005 ELECTROCARDIOGRAM TRACING: CPT | Performed by: PHYSICIAN ASSISTANT

## 2023-03-12 PROCEDURE — 80053 COMPREHEN METABOLIC PANEL: CPT

## 2023-03-12 PROCEDURE — 6370000000 HC RX 637 (ALT 250 FOR IP): Performed by: PHYSICIAN ASSISTANT

## 2023-03-12 PROCEDURE — 36415 COLL VENOUS BLD VENIPUNCTURE: CPT

## 2023-03-12 PROCEDURE — 99284 EMERGENCY DEPT VISIT MOD MDM: CPT

## 2023-03-12 PROCEDURE — 2580000003 HC RX 258: Performed by: PHYSICIAN ASSISTANT

## 2023-03-12 PROCEDURE — 85025 COMPLETE CBC W/AUTO DIFF WBC: CPT

## 2023-03-12 PROCEDURE — 80177 DRUG SCRN QUAN LEVETIRACETAM: CPT

## 2023-03-12 PROCEDURE — 96374 THER/PROPH/DIAG INJ IV PUSH: CPT

## 2023-03-12 RX ORDER — LORAZEPAM 1 MG/1
1 TABLET ORAL ONCE
Status: COMPLETED | OUTPATIENT
Start: 2023-03-12 | End: 2023-03-12

## 2023-03-12 RX ORDER — MECLIZINE HCL 25MG 25 MG/1
50 TABLET, CHEWABLE ORAL ONCE
Status: COMPLETED | OUTPATIENT
Start: 2023-03-12 | End: 2023-03-12

## 2023-03-12 RX ORDER — ONDANSETRON 4 MG/1
4 TABLET, ORALLY DISINTEGRATING ORAL ONCE
Status: COMPLETED | OUTPATIENT
Start: 2023-03-12 | End: 2023-03-12

## 2023-03-12 RX ORDER — 0.9 % SODIUM CHLORIDE 0.9 %
1000 INTRAVENOUS SOLUTION INTRAVENOUS ONCE
Status: COMPLETED | OUTPATIENT
Start: 2023-03-12 | End: 2023-03-13

## 2023-03-12 RX ADMIN — ONDANSETRON 4 MG: 4 TABLET, ORALLY DISINTEGRATING ORAL at 23:07

## 2023-03-12 RX ADMIN — LORAZEPAM 1 MG: 1 TABLET ORAL at 23:07

## 2023-03-12 RX ADMIN — SODIUM CHLORIDE 1000 ML: 9 INJECTION, SOLUTION INTRAVENOUS at 23:06

## 2023-03-12 RX ADMIN — MECLIZINE HYDROCHLORIDE 50 MG: 25 TABLET, CHEWABLE ORAL at 23:07

## 2023-03-12 ASSESSMENT — PAIN - FUNCTIONAL ASSESSMENT: PAIN_FUNCTIONAL_ASSESSMENT: NONE - DENIES PAIN

## 2023-03-12 NOTE — Clinical Note
Aishwarya Monahan was seen and treated in our emergency department on 3/12/2023. He may return to work on 03/14/2023. If you have any questions or concerns, please don't hesitate to call.       Laura Feliciano PA-C

## 2023-03-12 NOTE — Clinical Note
Alon Allred was seen and treated in our emergency department on 3/12/2023. He may return to work on 03/15/2023. If you have any questions or concerns, please don't hesitate to call.       Elicia De La Garza PA-C

## 2023-03-13 VITALS
WEIGHT: 200 LBS | TEMPERATURE: 97.9 F | HEIGHT: 74 IN | RESPIRATION RATE: 21 BRPM | HEART RATE: 66 BPM | BODY MASS INDEX: 25.67 KG/M2 | OXYGEN SATURATION: 96 % | SYSTOLIC BLOOD PRESSURE: 118 MMHG | DIASTOLIC BLOOD PRESSURE: 92 MMHG

## 2023-03-13 LAB
AMPHETAMINES UR QL SCN: NEGATIVE
BARBITURATES UR QL SCN: NEGATIVE
BENZODIAZ UR QL SCN: NEGATIVE
BZE UR QL SCN: NEGATIVE
CANNABINOIDS UR QL SCN: NEGATIVE
EKG ATRIAL RATE: 75 BPM
EKG P AXIS: 68 DEGREES
EKG P-R INTERVAL: 152 MS
EKG Q-T INTERVAL: 388 MS
EKG QRS DURATION: 98 MS
EKG QTC CALCULATION (BAZETT): 433 MS
EKG R AXIS: 61 DEGREES
EKG T AXIS: 18 DEGREES
EKG VENTRICULAR RATE: 75 BPM
FENTANYL: NEGATIVE
OPIATES UR QL SCN: NEGATIVE
OXYCODONE: NEGATIVE
PCP UR QL SCN: NEGATIVE

## 2023-03-13 PROCEDURE — 6360000002 HC RX W HCPCS: Performed by: PHYSICIAN ASSISTANT

## 2023-03-13 PROCEDURE — 80307 DRUG TEST PRSMV CHEM ANLYZR: CPT

## 2023-03-13 PROCEDURE — 93010 ELECTROCARDIOGRAM REPORT: CPT | Performed by: INTERNAL MEDICINE

## 2023-03-13 RX ORDER — MECLIZINE HYDROCHLORIDE 25 MG/1
25 TABLET ORAL 4 TIMES DAILY PRN
Qty: 30 TABLET | Refills: 0 | Status: SHIPPED | OUTPATIENT
Start: 2023-03-13 | End: 2023-03-23

## 2023-03-13 RX ORDER — KETOROLAC TROMETHAMINE 30 MG/ML
30 INJECTION, SOLUTION INTRAMUSCULAR; INTRAVENOUS ONCE
Status: COMPLETED | OUTPATIENT
Start: 2023-03-13 | End: 2023-03-13

## 2023-03-13 RX ORDER — LORAZEPAM 0.5 MG/1
0.5 TABLET ORAL 3 TIMES DAILY PRN
Qty: 12 TABLET | Refills: 0 | Status: SHIPPED | OUTPATIENT
Start: 2023-03-13 | End: 2023-03-17

## 2023-03-13 RX ADMIN — KETOROLAC TROMETHAMINE 30 MG: 30 INJECTION, SOLUTION INTRAMUSCULAR at 00:42

## 2023-03-13 ASSESSMENT — ENCOUNTER SYMPTOMS
RHINORRHEA: 0
NAUSEA: 1
SHORTNESS OF BREATH: 0
DIARRHEA: 0
ABDOMINAL PAIN: 0
COUGH: 0
SORE THROAT: 0
PHOTOPHOBIA: 0
VOMITING: 0

## 2023-03-13 NOTE — ED NOTES
PT medicated per MAR. PT and VS assessed. respirations even and unlabored. Call light in reach. Family at bedside.      Martha Hodgson RN  03/12/23 [id number]

## 2023-03-13 NOTE — ED NOTES
PT resting in bed. PT and VS assessed. Call light in reach. PT denies needs at this time.      Ethan Rey RN  03/13/23 [id number]

## 2023-03-13 NOTE — ED PROVIDER NOTES
[facility] [address] [address] EMERGENCY DEPT      Pt Name: Brennan Allison  MRN: [id number]  [place] 1980  Date of evaluation: 3/12/2023  Provider: Laura Feliciano PA-C    CHIEF COMPLAINT       Chief Complaint   Patient presents with    Emesis       Nurses Notes reviewed and I agree except as noted in the HPI. HISTORY OF PRESENT ILLNESS    Brennan Allison is a 40 y.o. male who presents from home via private vehicle driven by his parents for dizziness. 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 He woke up at 2100 and felt nauseous and dizzy. This dizziness is described as a spinning. Patient vomited a couple times and felt better. He took his trash out and upon coming back in the dizziness worsened. He laid on the floor and texted his parents \"HELP. \"  Parents came over and found him laying face down on the floor. He was responsive but flushed. He answered questions appropriately. Patient believes he had a seizure while on the ground. Patient reports feeling tired and weak consistent with prior postictal states. Patient affirms continued dizziness that occurs with head movement. Patient sees Dr. Fouzia Elizabeth for his seizures and maintains he is compliant with his Keppra. Mother explains that patient has seizures infrequently but when they occur, it happens if he wakes up too quickly. Patient also has been diagnosed with diabetes but mother reports he is not compliant with checking his blood sugar or taking his medicine she believes. His blood sugars have been high and he has been ordered a Dexcom to monitor his sugars more closely. Patient reports posterior neck pain which he believes is due to sleeping wrong. It is a frequent complaint of his and not associated with symptoms today. Patient denies head injury, fever, chills, diarrhea, chest pain, dyspnea, change in appetite, or other complaints. There are no new medications.   Patient denies smoking, drinking alcohol, or illicit drug use.    Location/Symptom: Vertigo  Timing/Onset: 2100   Context/Setting: Symptom onset after waking accompanied with nausea and vomiting  Quality: Spinning  Duration: Intermittent  Modifying Factors: Occurs with head movement  Severity: Moderate    REVIEW OF SYSTEMS     Review of Systems   Constitutional:  Positive for fatigue. Negative for chills, diaphoresis and fever. HENT:  Negative for congestion, rhinorrhea and sore throat. Eyes:  Negative for photophobia and visual disturbance. Respiratory:  Negative for cough and shortness of breath. Cardiovascular:  Negative for chest pain. Gastrointestinal:  Positive for nausea. Negative for abdominal pain, diarrhea and vomiting. Endocrine: Negative for polydipsia and polyuria. Genitourinary:  Negative for difficulty urinating. Musculoskeletal:  Positive for neck pain. Negative for gait problem. Skin:  Negative for rash. Neurological:  Positive for dizziness, seizures, weakness and headaches. Negative for light-headedness and numbness. Psychiatric/Behavioral:  Negative for confusion. The patient is not nervous/anxious. PAST MEDICAL HISTORY    has a past medical history of Anxiety, Diabetes mellitus ([facility] [address] and Seizures ([facility] [address] SURGICAL HISTORY      has no past surgical history on file. CURRENT MEDICATIONS       Discharge Medication List as of 3/13/2023  1:24 AM        CONTINUE these medications which have NOT CHANGED    Details   Semaglutide (RYBELSUS) 14 MG TABS Take 1 tablet by mouth dailyHistorical Med      levETIRAcetam (KEPPRA) 750 MG tablet TAKE 1 TABLET BY MOUTH TWO  TIMES DAILY, Disp-180 tablet, R-1Normal      glimepiride (AMARYL) 4 MG tablet Take 4 mg by mouth every morning (before breakfast)Historical Med      Glucose Blood (BLOOD GLUCOSE TEST VI) by In Vitro route Test 2 times per day.  Reli On meterHistorical Med      ezetimibe (ZETIA) 10 MG tablet Take 10 mg by mouth dailyHistorical Med      SITagliptin (JANUVIA) 100 MG tablet Take 100 mg by mouth dailyHistorical Med      metFORMIN (GLUCOPHAGE) 1000 MG tablet Take 1,000 mg by mouth 2 times daily (with meals)Historical Med      acetaminophen (TYLENOL) 500 MG tablet Take 500 mg by mouth every 6 hours as needed for PainHistorical Med      Blood Glucose Monitoring Suppl (BLOOD GLUCOSE METER) KIT Disp-1 kit, R-0, PrintRELI-ON METER. Use as directed             ALLERGIES     has No Known Allergies. FAMILY HISTORY     He indicated that his mother is alive. He indicated that his father is alive. He indicated that the status of his sister is unknown. He indicated that his maternal grandmother is . He indicated that his maternal grandfather is . He indicated that his paternal grandmother is . He indicated that his paternal grandfather is . family history includes Diabetes in his father, mother, and sister; Heart Disease in his maternal grandfather and mother; High Blood Pressure in his maternal grandmother and mother; High Cholesterol in his father, mother, and sister; Liver Disease in his mother; Other in his maternal grandmother; Substance Abuse in his maternal grandfather; Thyroid Disease in his mother. SOCIAL HISTORY    reports that he has never smoked. He has never used smokeless tobacco. He reports that he does not drink alcohol and does not use drugs. PHYSICAL EXAM     INITIAL VITALS:  height is 6' 2\" (1.88 m) and weight is 200 lb (90.7 kg). His oral temperature is 97.9 °F (36.6 °C). His blood pressure is 118/92 (abnormal) and his pulse is 66. His respiration is 21 and oxygen saturation is 96%. Physical Exam  Vitals and nursing note reviewed. Constitutional:       General: He is not in acute distress. Appearance: Normal appearance. He is well-developed. He is not toxic-appearing. HENT:      Head: Normocephalic and atraumatic. Right Ear: Hearing, tympanic membrane and ear canal normal. No hemotympanum.       Left Ear: Hearing, tympanic membrane and ear canal normal. No hemotympanum. Nose: Nose normal.      Mouth/Throat:      Pharynx: Uvula midline. Eyes:      General: Lids are normal.      Conjunctiva/sclera: Conjunctivae normal.      Pupils: Pupils are equal, round, and reactive to light. Neck:      Vascular: No JVD. Trachea: Trachea normal. No tracheal deviation. Cardiovascular:      Rate and Rhythm: Normal rate and regular rhythm. Heart sounds: Normal heart sounds. No murmur heard. Pulmonary:      Effort: Pulmonary effort is normal.      Breath sounds: Normal breath sounds. Abdominal:      General: There is no distension. Palpations: Abdomen is soft. Abdomen is not rigid. Tenderness: There is no abdominal tenderness. There is no guarding. Musculoskeletal:         General: Normal range of motion. Cervical back: Normal range of motion and neck supple. No rigidity. Comments: Extremities well perfused; good strength appreciated in all muscle groups. No signs of DVT. Lymphadenopathy:      Cervical: No cervical adenopathy. Skin:     General: Skin is warm and dry. Findings: No rash. Neurological:      Mental Status: He is alert and oriented to person, place, and time. GCS: GCS eye subscore is 4. GCS verbal subscore is 5. GCS motor subscore is 6. Cranial Nerves: No cranial nerve deficit. Sensory: No sensory deficit. Comments: Cranial nerves II through XII are normal as tested. Cerebellar tests are normal as tested. Psychiatric:         Speech: Speech normal.         Behavior: Behavior normal. Behavior is cooperative. Thought Content:  Thought content normal.         Judgment: Judgment normal.       DIFFERENTIAL DIAGNOSIS:   Including but not limited to: BPPV, labyrinthitis, breakthrough seizure, medication noncompliance, hyperglycemia    Diagnoses Considered but I have low suspicion of:   DKA, sepsis, JAMAR, CVA    DIAGNOSTIC RESULTS     EKG: All [facility]'s [facility] who either signs or Co-signs this chart in the absence of a cardiologist.  Ventricular rate 75 bpm  ID interval 152 ms  QRS duration 98 ms  QTc interval 433 ms  P-R-T axes 68, 61, 18  Normal sinus rhythm. No STEMI    RADIOLOGY: non-plain film images(s) such as CT,Ultrasound and MRI are read by the radiologist.  Plain radiographic images are visualized and preliminarily interpreted by the emergency physician unless otherwise stated below. No orders to display       LABS:   Labs Reviewed   CBC WITH AUTO DIFFERENTIAL - Abnormal; Notable for the following components:       Result Value    Hemoglobin 13.9 (*)     All other components within normal limits   COMPREHENSIVE METABOLIC PANEL - Abnormal; Notable for the following components:    Glucose 299 (*)     Sodium 134 (*)     Alkaline Phosphatase 128 (*)     Total Bilirubin 0.2 (*)     All other components within normal limits   URINE DRUG SCREEN   ANION GAP   GLOMERULAR FILTRATION RATE, ESTIMATED   OSMOLALITY   LEVETIRACETAM LEVEL       EMERGENCY DEPARTMENT COURSE:   Vitals:    Vitals:    03/13/23 0014 03/13/23 0018 03/13/23 0042 03/13/23 0127   BP: 124/87 121/76 117/86 (!) 118/92   Pulse:  73 72 66   Resp:  22 24 21   Temp:       TempSrc:       SpO2:  98% 97% 96%   Weight:       Height:           MDM:  The patient was seen and evaluated within the ED today for vertigo, nausea, and breakthrough seizure. On exam, I appreciated a neurologically intact 81-year-old gentleman who appeared fatigued. Old records were reviewed. Within the department, I observed the patient's vital signs to be within acceptable range. Radiological studies were considered but not deemed necessary as patient's symptoms resolved while in the department. Laboratory work was reassuring. Within the department, the patient was treated with Antivert, Ativan, Zofran, and saline.  I observed the patient's condition to modestly improve during the duration of their stay. The patient has remained stable in the ER with good vital signs. On re-exam, the patient was non-toxic appearing and maintained intact neurological status. No distress was apparent. There have been no signs of sinister etiology for patient's headache or dizziness. I have considered CVA, ICH and this patient's presentation was not consistent with such entities and therefore, no further testing was warranted. The patient was comfortable with the plan of discharge home and to follow up with his neurologist and PCP. Anticipatory guidance was given. The patient was instructed to return to the emergency department for any worsening of their symptoms. Patient was discharged from the emergency department in good condition with all questions answered. See disposition below. I have given the patient strict written and verbal instructions about care at home, follow-up, and signs and symptoms of worsening of condition and they did verbalize understanding. CRITICAL CARE:   None    CONSULTS:  None    PROCEDURES:  None    FINAL IMPRESSION      1. Uncontrolled type 2 diabetes mellitus with hyperglycemia (HCC)    2. Seizure disorder (HCC)    3. Vertigo    4. Nausea and vomiting, unspecified vomiting type    5. Cervical pain (neck)          DISPOSITION/PLAN     1. Uncontrolled type 2 diabetes mellitus with hyperglycemia (HCC)    2. Seizure disorder (HCC)    3. Vertigo    4. Nausea and vomiting, unspecified vomiting type    5.  Cervical pain (neck)        PATIENT REFERRED TO:  Herman Christianson MD  [address] [place] [address]  [phone]    Schedule an appointment as soon as possible for a visit       Susanne Wagner MD  [address] [place] [address]  [phone]      As scheduled sooner if worse    DISCHARGE MEDICATIONS:  Discharge Medication List as of 3/13/2023  1:24 AM        START taking these medications    Details   meclizine (ANTIVERT) 25 MG tablet Take 1 tablet by mouth 4 times daily as needed for Dizziness, Disp-30 tablet, R-0Normal      LORazepam (ATIVAN) 0.5 MG tablet Take 1 tablet by mouth 3 times daily as needed (Dizziness. Take along with Antivert) for up to 4 days.  Max Daily Amount: 1.5 mg, Disp-12 tablet, R-0Normal             (Please note that portions of this note were completed with a voice recognition program.  Efforts were made to edit the dictations but occasionally words are mis-transcribed.)    Owen Cummings PA-C 03/13/23 6:13 PM    DELLA Hill PA-C  03/13/23 [id number]

## 2023-03-13 NOTE — ED TRIAGE NOTES
PT to the ED with complaint of fatigue and emesis today . PT states he woke up around 9 PM and started vomiting. PT states he has a history of seizures and is unsure if he had a seizure but he states he woke up laying on the ground. PT states he laid down and does not think he hit his head on anything. PT is alert and oriented on arrival. Respirations are even and unlabored.

## 2023-03-14 ENCOUNTER — OFFICE VISIT (OUTPATIENT)
Dept: INTERNAL MEDICINE CLINIC | Age: 43
End: 2023-03-14
Payer: COMMERCIAL

## 2023-03-14 VITALS
SYSTOLIC BLOOD PRESSURE: 117 MMHG | TEMPERATURE: 97.9 F | WEIGHT: 199.6 LBS | BODY MASS INDEX: 25.63 KG/M2 | DIASTOLIC BLOOD PRESSURE: 77 MMHG | HEART RATE: 86 BPM

## 2023-03-14 DIAGNOSIS — Z79.4 TYPE 2 DIABETES MELLITUS WITHOUT COMPLICATION, WITH LONG-TERM CURRENT USE OF INSULIN (HCC): Primary | ICD-10-CM

## 2023-03-14 DIAGNOSIS — E11.9 TYPE 2 DIABETES MELLITUS WITHOUT COMPLICATION, WITH LONG-TERM CURRENT USE OF INSULIN (HCC): Primary | ICD-10-CM

## 2023-03-14 DIAGNOSIS — Z79.4 TYPE 2 DIABETES MELLITUS WITH HYPERGLYCEMIA, WITH LONG-TERM CURRENT USE OF INSULIN (HCC): ICD-10-CM

## 2023-03-14 DIAGNOSIS — E11.65 TYPE 2 DIABETES MELLITUS WITH HYPERGLYCEMIA, WITH LONG-TERM CURRENT USE OF INSULIN (HCC): ICD-10-CM

## 2023-03-14 LAB — LEVETIRACETAM SERPL-MCNC: 7 UG/ML (ref 10–40)

## 2023-03-14 PROCEDURE — G0108 DIAB MANAGE TRN  PER INDIV: HCPCS | Performed by: INTERNAL MEDICINE

## 2023-03-14 PROCEDURE — 95250 CONT GLUC MNTR PHYS/QHP EQP: CPT | Performed by: INTERNAL MEDICINE

## 2023-03-14 NOTE — PATIENT INSTRUCTIONS
Research into the insulin pen or the V-Go  -You need insulin to control your blood sugars  -You are at risk for hospitalization for DKA or diabetes complications with your current blood sugars    2. Pharmacist will call Dr. Tavarez about trying to get the Dexcom    We'll have a phone call in about 2 weeks to discuss your insulin options

## 2023-03-14 NOTE — PROGRESS NOTES
[facility]  [facility] [address] [facility], [address]  [phone] (phone)  [phone] (fax)    Patient ID: Melida Todd 1980  Referring Provider: Dr. Marianne Lopez     Patient's name and  were verified. Subjective:    He presents for a follow-up diabetic visit. He has type 2 diabetes mellitus. Home regimen includes: Biguanide, Sulfonylurea, DDP-IV-1, and GLP-1 Agonist He is compliant some of the time. Assessment:     Lab Results   Component Value Date/Time    LABA1C 11.2 2022 09:10 AM    BUN 15 2023 11:12 PM    CREATININE 0.7 2023 11:12 PM     Vitals:    23 1737   BP: 117/77   Pulse: 86   Temp: 97.9 °F (36.6 °C)   Weight: 199 lb 9.6 oz (90.5 kg)     Wt Readings from Last 3 Encounters:   23 199 lb 9.6 oz (90.5 kg)   23 200 lb (90.7 kg)   23 203 lb (92.1 kg)     Ht Readings from Last 3 Encounters:   23 6' 2\" (1.88 m)   23 6' 2\" (1.88 m)   22 6' 2\" (1.88 m)       Diabetes Pharmacotherapy:  Glimepiride 4 mg every morning  Metformin 1000 mg BID with meals  Rybelsus 14 mg daily  Januvia 100 mg daily    Glucose Trends:   Current monitoring regimen: Dexcom CGM - checks 4+ times daily  Home blood sugar trends:    -V.  High: 76%, High: 24%, Target: 0%, Low: 0%, V. Low: 0%   -Average Glucose: 290 mg/dL  Any episodes of hypoglycemia? no    Lifestyle Factors:   Previous visit with dietician: yes - 2023  Current diet: did not review in depth today  Current exercise: did not review in depth today    Health Maintenance:  Diabetes Management   Topic Date Due    Diabetic Alb to Cr ratio (uACR) test  Never done    A1C test (Diabetic or Prediabetic)  2023     Eye exam current (within one year): yes Date: 10/22/22, Rosemarei Lopez  Any history of foot problems? no  Foot exam up to date: yes Date: 22  Immunizations up to date:               Pneumonia: no              Influenza: no  The 10-year ASCVD risk score (Santo MCCABE, et al., 2019) is: 4.6%  Last panel - LDL:   Lab Results   Component Value Date    LDLCALC 212 (H) 12/31/2022   Taking ASA:  No   Taking statin: No - atorvastatin recently stopped by PCP, currently on Zetia; office is attempting coverage for Repatha  Last microalbumin/creatinine ratio: overdue   Taking ACE/ARB: No - not indicated  Depression screening completed. 11/30/22    Focus:   Diabetes Education. Last A1C: 11.2% (12/2022). Angeline Ramirez and his mother present today for 2 week follow-up after Dexcom sample was provided. Angeline Ramirez appears apathetic about the Dexcom; he didn't like or dislike the device and found that he rarely looked at his blood sugars. Wilian/his mother have not heard back from Dr. [facility] office about the [facility]; they would like it sent through [facility]. We discussed his TIR of 0% and his average glucose of 290 mg/dL. His readings were consistently very elevated. We discussed that insulin therapy is necessary at this time. Angeline Ramirez refuses insulin therapy - he is unable to provide specific reasons for refusal other than dislike of needles. I demonstrated the insulin injection technique using a pen; he refused to try out the demo pen. He is unwilling to have his mom administer his daily shot. We also discussed the option of the V-Go as an alternative. We discussed Wilian's high risk for diabetes complications. We also discussed the concept of glucose toxicity and that the insulin may be able to be temporary. Of note, Angeline Ramirez experienced an ED visit for dizziness on Sunday - it may or may not have been related to sugar? We will again encourage insulin therapy during a 2 week phone call. Billed for CGM training at last visit.       Curriculum Area Focus: Diabetes complications, Glucose checks/goals, Medication adherence, and Diabetes Technology  DSME PLAN:     Research into the insulin pen or the V-Go  -You need insulin to control your blood sugars  -You are at risk for hospitalization for DKA or diabetes complications with your current blood sugars    2. Pharmacist will call Dr. Michael Monson about trying to get the [facility] have a phone call in about 2 weeks to discuss your insulin options     Goals Addressed                   This Visit's Progress     Check blood sugars using the Dexcom sample   On track     Limit drinking beverages with sugar in them (Sweet Tea)   Not on track     Start insulin therapy   Not on track             Meter download, medications, PMH and nursing assessment reviewed. Samreen Bolton states He is willing to participate in this plan of care and verbalized understanding of all instructions provided. Teach back used to verify comprehension. Follow-up: 2 week phone call, 1 month dietician, 2 month [facility] RN    Total time involved in direct patient education: 30 minutes.    Individual Education appointment justified due to: Class Availability    For Pharmacy Admin Tracking Only    Program: Medical Group  Time Spent (min): [address] [place] [address], PharmD, [facility]  Internal Medicine Clinical Pharmacist  [phone]

## 2023-03-30 ENCOUNTER — TELEPHONE (OUTPATIENT)
Dept: INTERNAL MEDICINE CLINIC | Age: 43
End: 2023-03-30

## 2023-04-01 LAB
EKG ATRIAL RATE: 75 BPM
EKG P AXIS: 68 DEGREES
EKG P-R INTERVAL: 152 MS
EKG Q-T INTERVAL: 388 MS
EKG QRS DURATION: 98 MS
EKG QTC CALCULATION (BAZETT): 433 MS
EKG R AXIS: 61 DEGREES
EKG T AXIS: 18 DEGREES
EKG VENTRICULAR RATE: 75 BPM

## 2023-04-21 ENCOUNTER — OFFICE VISIT (OUTPATIENT)
Dept: NEUROLOGY | Age: 43
End: 2023-04-21
Payer: COMMERCIAL

## 2023-04-21 VITALS
HEIGHT: 74 IN | BODY MASS INDEX: 25.15 KG/M2 | OXYGEN SATURATION: 98 % | HEART RATE: 76 BPM | WEIGHT: 196 LBS | SYSTOLIC BLOOD PRESSURE: 115 MMHG | DIASTOLIC BLOOD PRESSURE: 65 MMHG

## 2023-04-21 DIAGNOSIS — G40.909 SEIZURE DISORDER (HCC): Primary | ICD-10-CM

## 2023-04-21 PROCEDURE — G8427 DOCREV CUR MEDS BY ELIG CLIN: HCPCS | Performed by: PSYCHIATRY & NEUROLOGY

## 2023-04-21 PROCEDURE — G8417 CALC BMI ABV UP PARAM F/U: HCPCS | Performed by: PSYCHIATRY & NEUROLOGY

## 2023-04-21 PROCEDURE — 99214 OFFICE O/P EST MOD 30 MIN: CPT | Performed by: PSYCHIATRY & NEUROLOGY

## 2023-04-21 PROCEDURE — 1036F TOBACCO NON-USER: CPT | Performed by: PSYCHIATRY & NEUROLOGY

## 2023-05-09 ENCOUNTER — CLINICAL DOCUMENTATION (OUTPATIENT)
Dept: INTERNAL MEDICINE CLINIC | Age: 43
End: 2023-05-09

## 2023-05-09 NOTE — PROGRESS NOTES
Charles Hodges present for initiation of [facility] personal CGM  Referring provider: Dr. Alicja Kelly 2 sensor inserted. Sensor initiated via Harry and David 2 . Reviewed interstitial glucose versus capillary glucose. Instructed on  setting and set up the appropriate alarms. Time and date set on   80 for Low Alarm   250 for High Alarm. Explained the arrows and the meaning of their blood sugar. Patient instructed the system is water resistant and can shower. To reinforce sensor with tape as needed if it begins to peel away from the skin. Encouraged Charles Hodges to call Kasi Polanco or the [facility] with any questions. Franco Ames, via teach back, the understanding of:  Difference between sensor glucose and blood glucose meter. Fingerstick confirmations required for calibrations. Blood sugar trend arrows. Site selection, rotation, preparation. Proper steps to sensor insertion. Starting the sensor. Navigation of status, set up, and alert screens. Scanning frequency.   Options for support

## 2023-05-16 ENCOUNTER — OFFICE VISIT (OUTPATIENT)
Dept: INTERNAL MEDICINE CLINIC | Age: 43
End: 2023-05-16
Payer: COMMERCIAL

## 2023-05-16 VITALS
BODY MASS INDEX: 25.8 KG/M2 | WEIGHT: 201 LBS | DIASTOLIC BLOOD PRESSURE: 81 MMHG | SYSTOLIC BLOOD PRESSURE: 130 MMHG | TEMPERATURE: 98.6 F | HEIGHT: 74 IN | HEART RATE: 80 BPM

## 2023-05-16 DIAGNOSIS — E11.9 TYPE 2 DIABETES MELLITUS WITHOUT COMPLICATION, WITHOUT LONG-TERM CURRENT USE OF INSULIN (HCC): Primary | ICD-10-CM

## 2023-05-16 PROCEDURE — G0108 DIAB MANAGE TRN  PER INDIV: HCPCS | Performed by: REGISTERED NURSE

## 2023-05-16 RX ORDER — EVOLOCUMAB 140 MG/ML
1 INJECTION, SOLUTION SUBCUTANEOUS
COMMUNITY

## 2023-05-16 NOTE — PROGRESS NOTES
[facility]  [facility]. [address] [facility], [address]  [phone] (phone)  [phone] (fax)    Patient ID: Joan Munoz 1980  Referring Provider: Dr. Gena Rushing     Patient's name and  were verified. Subjective:    He presents for a follow-up diabetic visit. He has type 2 diabetes mellitus. Home regimen includes: Biguanide, Sulfonylurea, DDP-IV-1, and GLP-1 Agonist He is noncompliant a majority of the time. Assessment:     Lab Results   Component Value Date/Time    LABA1C 11.1 04/15/2023 09:18 AM    BUN 16 04/15/2023 09:18 AM    BUN 16 04/15/2023 09:18 AM    CREATININE 0.80 04/15/2023 09:18 AM    CREATININE 0.79 04/15/2023 09:18 AM     Vitals:    23 1251   BP: 130/81   Site: Right Upper Arm   Position: Sitting   Pulse: 80   Temp: 98.6 °F (37 °C)   Weight: 201 lb (91.2 kg)   Height: 6' 2\" (1.88 m)     Wt Readings from Last 3 Encounters:   23 201 lb (91.2 kg)   23 196 lb (88.9 kg)   23 199 lb 9.6 oz (90.5 kg)     Ht Readings from Last 3 Encounters:   23 6' 2\" (1.88 m)   23 6' 2\" (1.88 m)   23 6' 2\" (1.88 m)       Diabetes Pharmacotherapy:  Metformin 1000mg BID  Glimepiride 4mg daily  Rybelsus 14 mg daily  Januvia 100mg daily    Glucose Trends:   Glucose at FBS today resulted at 300mg/dl  Current monitoring regimen: Freestyle [place] 2 CGM - checks 4+ times daily  Home blood sugar trends:    -V.  High: 67%, High: 32%, Target: 1%, Low: 0%, V. Low: 0%   -Average Glucose: 286mg/dL; %time CGM active 57%              -minimal glucose information from [place] CGM; swiping only once in 24 hours  Any episodes of hypoglycemia? no   -Treats with \"food\"    Lifestyle Factors:   Previous visit with dietician: yes - 23  Current diet: B: rare --cereal or connie sticks            L: 9pm waking takes meds                              Ramen noodles/ 12 oz glass milk                       Work at [employer]                       D: 3-4am personal isaiah

## 2023-05-16 NOTE — PATIENT INSTRUCTIONS
Stop regular pop --you are already down to 1/2 can per day  Try to stop the sweet tea--keep choosing sugar free options                       --there are so many sugar free choices           Carry the BAMBI squirt bottle with you when you go out to eat  Good job giving your Repatha injection!            -double check with Dr. Rylee Vang if it is every other week                  Or once monthly   We will ask Dr. Rylee Vang about Levemir once daily injection to                  Take when you get home from work in the morning  Make a point to swipe your blood sugar when you get home from work, when you wake up and before you eat lunch at work

## 2023-05-24 ENCOUNTER — TELEPHONE (OUTPATIENT)
Dept: INTERNAL MEDICINE CLINIC | Age: 43
End: 2023-05-24

## 2023-05-24 NOTE — TELEPHONE ENCOUNTER
Called patient's mother, Buzz Connelly- who is on [employer]- and left message asking if Kun Stafford had gotten the Levemir that was ordered for him and if he had started it. I asked for a return call to let us know.

## 2023-05-24 NOTE — TELEPHONE ENCOUNTER
Patient's mom called back in and said that he will get his Levemir on 5/25/2023 but will get it and will try to get him to start it then and he will take it when he gets home after work in the mornings.

## 2023-06-23 ENCOUNTER — OFFICE VISIT (OUTPATIENT)
Dept: NEUROLOGY | Age: 43
End: 2023-06-23
Payer: COMMERCIAL

## 2023-06-23 VITALS
SYSTOLIC BLOOD PRESSURE: 120 MMHG | HEART RATE: 86 BPM | WEIGHT: 197 LBS | DIASTOLIC BLOOD PRESSURE: 65 MMHG | OXYGEN SATURATION: 99 % | HEIGHT: 74 IN | BODY MASS INDEX: 25.28 KG/M2

## 2023-06-23 DIAGNOSIS — G40.909 SEIZURE DISORDER (HCC): Primary | ICD-10-CM

## 2023-06-23 PROCEDURE — 1036F TOBACCO NON-USER: CPT | Performed by: NURSE PRACTITIONER

## 2023-06-23 PROCEDURE — 99213 OFFICE O/P EST LOW 20 MIN: CPT | Performed by: NURSE PRACTITIONER

## 2023-06-23 PROCEDURE — G8427 DOCREV CUR MEDS BY ELIG CLIN: HCPCS | Performed by: NURSE PRACTITIONER

## 2023-06-23 PROCEDURE — G8417 CALC BMI ABV UP PARAM F/U: HCPCS | Performed by: NURSE PRACTITIONER

## 2023-06-23 RX ORDER — LEVETIRACETAM 750 MG/1
TABLET ORAL
Qty: 180 TABLET | Refills: 1 | Status: SHIPPED | OUTPATIENT
Start: 2023-06-23

## 2023-06-23 NOTE — PATIENT INSTRUCTIONS
Continue with Keppra 750 mg twice a day. Refills given. Take your medications on time as prescribed. Follow up in 6 months or sooner if needed. Call if any questions or concerns.

## 2023-06-23 NOTE — PROGRESS NOTES
NEUROLOGY OUT PATIENT FOLLOW UP NOTE:  6/23/20239:56 AM    Amanda Solo is here for follow up for seizure           No Known Allergies    Current Outpatient Medications:     Semaglutide (RYBELSUS) 14 MG TABS, Take 1 tablet by mouth daily, Disp: , Rfl:     levETIRAcetam (KEPPRA) 750 MG tablet, TAKE 1 TABLET BY MOUTH TWO  TIMES DAILY, Disp: 180 tablet, Rfl: 1    glimepiride (AMARYL) 4 MG tablet, Take 1 tablet by mouth every morning (before breakfast), Disp: , Rfl:     Glucose Blood (BLOOD GLUCOSE TEST VI), by In Vitro route Test 2 times per day. Reli On meter, Disp: , Rfl:     ezetimibe (ZETIA) 10 MG tablet, Take 1 tablet by mouth daily, Disp: , Rfl:     SITagliptin (JANUVIA) 100 MG tablet, Take 1 tablet by mouth daily, Disp: , Rfl:     metFORMIN (GLUCOPHAGE) 1000 MG tablet, Take 1 tablet by mouth 2 times daily (with meals), Disp: , Rfl:     acetaminophen (TYLENOL) 500 MG tablet, Take 1 tablet by mouth every 6 hours as needed for Pain, Disp: , Rfl:     Blood Glucose Monitoring Suppl (BLOOD GLUCOSE METER) KIT, RELI-ON METER. Use as directed, Disp: 1 kit, Rfl: 0    Evolocumab (REPATHA SURECLICK) 975 MG/ML SOAJ, Inject 1 each into the skin every 14 days (Patient not taking: Reported on 6/23/2023), Disp: , Rfl:     I reviewed the past medical history, allergies, medications, social history and family history. PE:   Vitals:    06/23/23 0943   BP: 120/65   Site: Left Upper Arm   Position: Sitting   Cuff Size: Medium Adult   Pulse: 86   SpO2: 99%   Weight: 197 lb (89.4 kg)   Height: 6' 2\" (1.88 m)      General Appearance:  awake, alert, oriented, in no distress. Gen: NAD, Language is Intact. Skin: no rash, lesion, dry  to touch. warm  Head: no rash, no icterus  Neck: There is no carotid bruits. The Neck is supple. Neuro: CN 2-12 grossly intact with no focal deficits. Power 5/5 Throughout symmetric, Reflexes are  symmetric. Long tracts are intact. Cerebellar exam is Intact. Sensory exam is intact to light touch.

## 2023-07-10 ENCOUNTER — TELEPHONE (OUTPATIENT)
Dept: INTERNAL MEDICINE CLINIC | Age: 43
End: 2023-07-10

## 2023-07-10 DIAGNOSIS — E11.9 TYPE 2 DIABETES MELLITUS WITHOUT COMPLICATION, WITHOUT LONG-TERM CURRENT USE OF INSULIN (HCC): Primary | ICD-10-CM

## 2023-07-10 NOTE — TELEPHONE ENCOUNTER
Per Kirsty Campbell RN, CDE's request, called patient's Mother, Catrachito Nathan (who is on [employer]) and left a message asking for a return call to let us know if Brigette Acevedo has started Levemir.

## 2023-07-11 NOTE — TELEPHONE ENCOUNTER
Patient 's mother, Deb Carpenter called back again and left a message stating that Jody Ramos will not take a shot and therefore was wanting to know if there is a pill form he can take for his diabetes and cholesterol. I returned the call and had to leave a message asking if he would be willing to take the pill form for Diabetes and if he has tried it before? When it comes to the cholesterol medication, she needs to address that with the doctor who prescribed that for him. Asked for a return call.

## 2023-07-12 NOTE — TELEPHONE ENCOUNTER
Patient's mother, Refugio Beltran returned the call and stated that Bruno Zee is already taking Metformin, Rybelsus and Januvia. She asked if there was something else her could take in a pill form instead of using insulin. He does not like to use needles. Please advise.  Thank you

## 2023-07-19 NOTE — TELEPHONE ENCOUNTER
RN spoke with Maureen Willis and Yoshi Carvajal 7/12/2023 regarding choices for Wilian's therapy for his blood sugars. He is already taking Rebysus, Amaryl, Januvia and Metformin. BMI is 25.29  A1C is 11.1%    There truly is not an option better than or other than insulin. CPeptide 9/25/2017 was 2.6    As Krunal Gandhi is so desperate to not take insulin shots, a Ceptide could help determine if Actos/ SGLT2 or increasing Amaryl could/ should be considered as an attempt to help. Insulin delivery devices also need to be a consideration if CPeptide is depleted. He is resistant having anything attached to him-currently he refuses to wear his [place]. Dr. Andreia Wright,       Please authorize the [facility] at [address] [place] [address] to teach/ assist to obtain a C-Peptide level. .   If you agree, please sign and fax back. Thank you.

## 2023-07-26 NOTE — TELEPHONE ENCOUNTER
Called and spoke to Tammie Hernandez. I asked that she have Ernesto Baer get a C-Peptide and Fasting Glucose. She asked if they could  the lab vouchers when he sees ELENA on 7/31/2023. I let her know that was ok. She will let us know when he gets the labs done.

## 2023-07-26 NOTE — TELEPHONE ENCOUNTER
Please call Marychuy Yang or Senthil Andres and let them know he needs to have a blood test done and he should be fasting for this. Send lab orders - CPeptide/ FBS to request lab    Also, to let us know when it has been completed.

## 2023-07-31 ENCOUNTER — OFFICE VISIT (OUTPATIENT)
Dept: INTERNAL MEDICINE CLINIC | Age: 43
End: 2023-07-31
Payer: COMMERCIAL

## 2023-07-31 ENCOUNTER — ENROLLMENT (OUTPATIENT)
Dept: INTERNAL MEDICINE CLINIC | Age: 43
End: 2023-07-31

## 2023-07-31 DIAGNOSIS — E11.9 TYPE 2 DIABETES MELLITUS WITHOUT COMPLICATION, WITHOUT LONG-TERM CURRENT USE OF INSULIN (HCC): Primary | ICD-10-CM

## 2023-07-31 LAB — HBA1C MFR BLD: 10.5 % (ref 4.3–5.7)

## 2023-07-31 PROCEDURE — 83037 HB GLYCOSYLATED A1C HOME DEV: CPT

## 2023-07-31 PROCEDURE — G0108 DIAB MANAGE TRN  PER INDIV: HCPCS

## 2023-07-31 PROCEDURE — NBSRV NON-BILLABLE SERVICE

## 2023-07-31 NOTE — PATIENT INSTRUCTIONS
Try to get your c-peptide labwork this weekend   -We will decide on next steps after we get these results - pill medications vs. V-Go    2.  Try to talk to someone else that takes insulin

## 2023-07-31 NOTE — PROGRESS NOTES
c-peptide labwork this weekend   -We will decide on next steps after we get these results - pill medications vs. V-Go    2. Try to talk to someone else that takes insulin       Goals Addressed                   This Visit's Progress     check blood sugars 3 times per day; scanning [place]   Not on track     Start insulin therapy   Not on track             Meter download, medications, PMH and nursing assessment reviewed. [employer] states He is willing to participate in this plan of care and verbalized understanding of all instructions provided. Teach back used to verify comprehension. Follow-up: 1 week lab work, 1.5 month [facility] PharmD    Total time involved in direct patient education: 60 minutes.    Individual Education appointment justified due to: Class Availability    For Pharmacy Admin Tracking Only    Program: Medical Group  Time Spent (min): [address], PharmD, ABHILASH Saul-ADM  Internal Medicine Clinical Pharmacist  [phone]

## 2023-08-01 VITALS
BODY MASS INDEX: 25.06 KG/M2 | TEMPERATURE: 98 F | HEART RATE: 74 BPM | WEIGHT: 195.2 LBS | SYSTOLIC BLOOD PRESSURE: 122 MMHG | DIASTOLIC BLOOD PRESSURE: 80 MMHG

## 2023-08-09 ENCOUNTER — TELEPHONE (OUTPATIENT)
Dept: INTERNAL MEDICINE CLINIC | Age: 43
End: 2023-08-09

## 2023-08-09 DIAGNOSIS — E11.9 TYPE 2 DIABETES MELLITUS WITHOUT COMPLICATION, WITHOUT LONG-TERM CURRENT USE OF INSULIN (HCC): Primary | ICD-10-CM

## 2023-08-09 NOTE — TELEPHONE ENCOUNTER
[facility]  [address] [place] [address], [place]. [address] [facility], [address] [place] [address]  [phone] (phone)  [phone] (fax)    Dr. Parvin Oro 1980 is being followed by the [facility] Clinic for Type 2 Diabetes. Current Diabetes Pharmacotherapy:  Glimepiride 4mg QAM  Metformin 1000mg BID  Rybelsus 14mg daily  Januvia 100mg daily  Levemir 10 units- not taking    Glucose Trends: not checking, last fingerstick check: 312mg/dL in office    Hemoglobin A1C   Date Value Ref Range Status   07/31/2023 10.5 (H) 4.3 - 5.7 % Final     Comment:     Performed at [facility] under CLIA #  [id number]        Latest Reference Range & Units 08/05/23 10:01   C-Peptide 1.1 - 4.4 NG/ML 3.2     Assessment:  Lily Reyes continues to adamantly refuse insulin administration. Given his last c-peptide result, non-insulin agents are still providing some benefit to Lily Reyes. Recommendation(s):   -Consider addition of an SGLT-2 and/or increase in glimepiride dose to 8mg QAM   -Pioglitazone addition would also be reasonable if there are cost concerns   -Brennan would be amenable to a change in his oral medications  -We will continue to encouraged insulin administration, but Lily Reyes is very resistant at this time. Please message back and/or review pended orders in response to the above recommendation(s).      Thank you,     Heidy Hameed, PharmD, BCPS, BC-[facility]  Internal Medicine Clinical Pharmacist  [phone] (fax)

## 2023-08-22 NOTE — TELEPHONE ENCOUNTER
Received message back from Dr. Dior Aguilera approving the medication changes. Will proceed with starting Jardiance 10mg daily and increasing glimepiride to 4mg BID  BMP in 2-3 weeks. Follow-up appt in 4 weeks with PharmD.

## 2023-08-23 RX ORDER — GLIMEPIRIDE 4 MG/1
TABLET ORAL
Qty: 180 TABLET | Refills: 1 | Status: SHIPPED | OUTPATIENT
Start: 2023-08-23

## 2023-08-23 NOTE — TELEPHONE ENCOUNTER
Reached Brennan/Brennan's mother to discuss the plan. They verbalized understanding of the medication changes/labwork.      For Pharmacy Admin Tracking Only    Program: Medical Group  CPA in place:  No  Recommendation Provided To: Provider: 3 via Fax sent to office  Intervention Detail: Dose Adjustment: 1, reason: Therapy Optimization, Lab(s) Ordered, and New Rx: 1, reason: Needs Additional Therapy  Intervention Accepted By: Provider: 3  Gap Closed?: No   Time Spent (min): [address] [place] [address] 280, PharmD, BCPS, [facility]  Internal Medicine Clinical Pharmacist  [phone]

## 2023-09-11 ENCOUNTER — TELEPHONE (OUTPATIENT)
Dept: INTERNAL MEDICINE CLINIC | Age: 43
End: 2023-09-11

## 2023-09-11 NOTE — TELEPHONE ENCOUNTER
Mario Babin called to notify pharmacist that prescriptions previously sent did not transmit. Provided verbal order to pharmacy for Jardiance 10mg w/ coupon information. Called Dr. Krystal Hernandez office to request that they e-prescribe the glimepiride 4mg BID and Jardiance 10mg 90 DS to the patient's mail order pharmacy [facility]).        For Pharmacy Admin Tracking Only    Program: Medical Group  CPA in place:  No  Recommendation Provided To: Provider: 1 via Verbally to provider and Pharmacy: 1  Intervention Detail: New Rx: 2, reason: Needs Additional Therapy  Intervention Accepted By: Provider: 1 and Pharmacy: 1  Gap Closed?: No   Time Spent (min): [address], PharmD, BCPS, [facility]  Internal Medicine Clinical Pharmacist  [phone]

## 2023-10-12 ENCOUNTER — OFFICE VISIT (OUTPATIENT)
Dept: INTERNAL MEDICINE CLINIC | Age: 43
End: 2023-10-12

## 2023-10-12 VITALS
BODY MASS INDEX: 25.55 KG/M2 | HEART RATE: 88 BPM | TEMPERATURE: 98.3 F | SYSTOLIC BLOOD PRESSURE: 133 MMHG | DIASTOLIC BLOOD PRESSURE: 80 MMHG | WEIGHT: 199 LBS

## 2023-10-12 DIAGNOSIS — E11.9 TYPE 2 DIABETES MELLITUS WITHOUT COMPLICATION, WITHOUT LONG-TERM CURRENT USE OF INSULIN (HCC): Primary | ICD-10-CM

## 2023-10-12 RX ORDER — LANOLIN ALCOHOL/MO/W.PET/CERES
500 CREAM (GRAM) TOPICAL 2 TIMES DAILY WITH MEALS
COMMUNITY

## 2023-10-12 NOTE — PATIENT INSTRUCTIONS
Start using the Conklin 2 again!  -Scan when you wake up, at your work break, and before bed    2. We will review your blood sugars from your Conklin in 1 week    3. Try the Dr. Mitchell Barbour Zero this weekend - see how your blood sugar responds    4.  Time to get rescheduled for your eye exam

## 2024-01-05 DIAGNOSIS — G40.909 SEIZURE DISORDER (HCC): ICD-10-CM

## 2024-01-05 RX ORDER — LEVETIRACETAM 750 MG/1
750 TABLET ORAL 2 TIMES DAILY
Qty: 180 TABLET | Refills: 3 | Status: SHIPPED | OUTPATIENT
Start: 2024-01-05

## 2024-01-16 ENCOUNTER — OFFICE VISIT (OUTPATIENT)
Dept: INTERNAL MEDICINE CLINIC | Age: 44
End: 2024-01-16
Payer: COMMERCIAL

## 2024-01-16 VITALS
TEMPERATURE: 97.9 F | WEIGHT: 198.4 LBS | HEART RATE: 88 BPM | DIASTOLIC BLOOD PRESSURE: 88 MMHG | BODY MASS INDEX: 25.46 KG/M2 | SYSTOLIC BLOOD PRESSURE: 130 MMHG | HEIGHT: 74 IN

## 2024-01-16 DIAGNOSIS — Z79.4 TYPE 2 DIABETES MELLITUS WITH HYPERGLYCEMIA, WITH LONG-TERM CURRENT USE OF INSULIN (HCC): Primary | ICD-10-CM

## 2024-01-16 DIAGNOSIS — E11.65 TYPE 2 DIABETES MELLITUS WITH HYPERGLYCEMIA, WITH LONG-TERM CURRENT USE OF INSULIN (HCC): Primary | ICD-10-CM

## 2024-01-16 LAB — HBA1C MFR BLD: 14 %

## 2024-01-16 PROCEDURE — NBSRV NON-BILLABLE SERVICE: Performed by: REGISTERED NURSE

## 2024-01-16 PROCEDURE — 83036 HEMOGLOBIN GLYCOSYLATED A1C: CPT | Performed by: REGISTERED NURSE

## 2024-01-16 PROCEDURE — G0108 DIAB MANAGE TRN  PER INDIV: HCPCS | Performed by: REGISTERED NURSE

## 2024-01-16 NOTE — PATIENT INSTRUCTIONS
Schedule appointment to get eyes checked  Stop all drinks that have any carbohydrates or sugar           In them.      Have a small glass of milk with your pills before              Going to bed. No other milk during the day  Limit sweets to only 1 every other day OR LESS  Start the Libre2 again--use your phone for scanning           --scan your sensor when you wake up                                          Before you eat lunch at work                                          Before you go to bed  You have to get these blood sugars in better control

## 2024-01-16 NOTE — PROGRESS NOTES
regular pop; juice; milk; rare sweet tea  Current exercise: ADL's --active at work; maintenance work    Health Maintenance:  Eye exam current (within one year): no Date: 10/2022, [facility]  Any history of foot problems? no  Foot exam up to date: yes Date: 7/2023, DM clinic  Immunizations up to date:    Pneumonia (dose of Prevnar 20 OR Pneumovax 23 + Prevnar 13): no   Influenza: no  The 10-year ASCVD risk score (Santo MCCABE, et al., 2019) is: 7%    Values used to calculate the score:      Age: 43 years      Sex: Male      Is Non- : No      Diabetic: Yes      Tobacco smoker: No      Systolic Blood Pressure: 130 mmHg      Is BP treated: No      HDL Cholesterol: 46 mg/dL      Total Cholesterol: 308 mg/dL   Last panel - LDL:   Lab Results   Component Value Date    LDLCALC 220 (H) 10/07/2023     Taking ASA:  No   Taking statin: No - taking ezetimibe/ Niacin  Last microalbumin/creatinine ratio:   Microalbumin Creatinine Ratio   Date Value Ref Range Status   06/12/2023 22 <30 MG/G Final      Taking ACE/ARB: No - not identified  Depression: Not at risk (11/30/2022)    PHQ-2     PHQ-2 Score: 0       Focus:   Diabetes Education. Last A1C: over 14% 1/16/2023. Brennan is taking his pills morning and night that are set up in pill boxes. He is not checking his blood sugars, he refuses to take insulin or his Repatha, he is not limiting carbs and especially not drinks with carb in them. Blood sugar control is very poor and he is at high risk for major health issues. His mom is with him today. She is very frustrated and anxious. Wilian is fairly non communicative. He most often answers with I don't know. Mom states he refuses to use his [place] at home or take any shots. He is consistently taking his pills morning (9pm) and night (7-8am). Unfortunately Domingobelsus is not being stored correctly, but at least he is getting the doses. Mom brought Brennan's Repatha with him today. RN assisted him in giving his dose.

## 2024-03-12 ENCOUNTER — OFFICE VISIT (OUTPATIENT)
Dept: INTERNAL MEDICINE CLINIC | Age: 44
End: 2024-03-12
Payer: COMMERCIAL

## 2024-03-12 DIAGNOSIS — E11.9 TYPE 2 DIABETES MELLITUS WITHOUT COMPLICATION, WITHOUT LONG-TERM CURRENT USE OF INSULIN (HCC): Primary | ICD-10-CM

## 2024-03-12 PROCEDURE — G0108 DIAB MANAGE TRN  PER INDIV: HCPCS

## 2024-03-12 PROCEDURE — NBSRV NON-BILLABLE SERVICE

## 2024-03-12 NOTE — PROGRESS NOTES
[facility]  [address] [place] [address]  [phone] (phone)  [phone] (fax)    Patient ID: Wilian Padron 1980  Referring Provider: Dr. Tavarez     Patient's name and  were verified.    Subjective:    He presents for a follow-up diabetic visit. He has type 2 diabetes mellitus. He is noncompliant a majority of the time.  Assessment:     Lab Results   Component Value Date/Time    LABA1C 14.0 2024 10:54 AM    BUN 16 04/15/2023 09:18 AM    BUN 16 04/15/2023 09:18 AM    CREATININE 0.80 04/15/2023 09:18 AM    CREATININE 0.79 04/15/2023 09:18 AM     Vitals:    24 0815   BP: 127/81   Pulse: 88   Temp: 98.3 °F (36.8 °C)   Weight: 92.3 kg (203 lb 6.4 oz)     Wt Readings from Last 3 Encounters:   24 92.3 kg (203 lb 6.4 oz)   24 90 kg (198 lb 6.4 oz)   12/15/23 91.2 kg (201 lb)     Ht Readings from Last 3 Encounters:   24 1.88 m (6' 2\")   12/15/23 1.88 m (6' 2\")   23 1.88 m (6' 2\")     Est, Glom Filt Rate   Date Value Ref Range Status   2023 >60 >60 ml/min/1.73m2 Final       Diabetes Pharmacotherapy:  Metformin 1000mg BID  Glimepiride 4mg BID  Jardiance 10mg daily  Rybelsus 14mg daily  Januvia 100mg daily  Levemir --not taking    Glucose Trends:   Glucose at 2 hrs PPD today resulted at 305mg/dl  Current monitoring regimen: Fingerstick blood tests - 0 times daily; also not currently wearing [place]  Home blood sugar trends (from [place] application at last visit in January)   -V. High: 76%, High: 23%, Target: 1%, Low: 0%, V. Low: 0%   -Average Glucose: 310mg/dL; GMI: n/a%;  %time CGM active 30%    Any episodes of hypoglycemia? no    Lifestyle Factors:   Previous visit with dietician: yes - 23  Current diet: B: variable            L: 3AM: chili                       D: 8AM: sandwich spread on bread                       Snacks:                        Beverages: water, milk  Current exercise:  some activity through work    Health Maintenance:  Diabetes

## 2024-03-12 NOTE — PATIENT INSTRUCTIONS
Good job getting your Repatha shot today    2. Bring your Levemir to your appointment with Dr. Tavarez next week   -Have your mom work with you to help give your insulin shots

## 2024-03-18 VITALS
WEIGHT: 203.4 LBS | HEART RATE: 88 BPM | SYSTOLIC BLOOD PRESSURE: 127 MMHG | BODY MASS INDEX: 26.12 KG/M2 | TEMPERATURE: 98.3 F | DIASTOLIC BLOOD PRESSURE: 81 MMHG

## 2024-04-19 ENCOUNTER — HOSPITAL ENCOUNTER (OUTPATIENT)
Dept: ULTRASOUND IMAGING | Age: 44
Discharge: HOME | End: 2024-04-19
Attending: FAMILY MEDICINE
Payer: COMMERCIAL

## 2024-04-19 DIAGNOSIS — R79.89 ELEVATED LFTS: ICD-10-CM

## 2024-04-19 PROCEDURE — 76705 ECHO EXAM OF ABDOMEN: CPT

## 2024-05-13 ENCOUNTER — OFFICE VISIT (OUTPATIENT)
Dept: INTERNAL MEDICINE CLINIC | Age: 44
End: 2024-05-13
Payer: COMMERCIAL

## 2024-05-13 DIAGNOSIS — E11.65 TYPE 2 DIABETES MELLITUS WITH HYPERGLYCEMIA, WITH LONG-TERM CURRENT USE OF INSULIN (HCC): Primary | ICD-10-CM

## 2024-05-13 DIAGNOSIS — Z79.4 TYPE 2 DIABETES MELLITUS WITH HYPERGLYCEMIA, WITH LONG-TERM CURRENT USE OF INSULIN (HCC): Primary | ICD-10-CM

## 2024-05-13 LAB — HBA1C MFR BLD: 13.1 % (ref 4.3–5.7)

## 2024-05-13 PROCEDURE — G0108 DIAB MANAGE TRN  PER INDIV: HCPCS

## 2024-05-13 PROCEDURE — 83036 HEMOGLOBIN GLYCOSYLATED A1C: CPT

## 2024-05-13 PROCEDURE — NBSRV NON-BILLABLE SERVICE

## 2024-05-13 RX ORDER — M-VIT,TX,IRON,MINS/CALC/FOLIC 27MG-0.4MG
1 TABLET ORAL DAILY
COMMUNITY

## 2024-05-13 ASSESSMENT — PATIENT HEALTH QUESTIONNAIRE - PHQ9
SUM OF ALL RESPONSES TO PHQ9 QUESTIONS 1 & 2: 0
1. LITTLE INTEREST OR PLEASURE IN DOING THINGS: NOT AT ALL
SUM OF ALL RESPONSES TO PHQ QUESTIONS 1-9: 0
2. FEELING DOWN, DEPRESSED OR HOPELESS: NOT AT ALL
SUM OF ALL RESPONSES TO PHQ QUESTIONS 1-9: 0

## 2024-05-13 NOTE — PROGRESS NOTES
[facility]  [address] [place] [address]  [phone] (phone)  [phone] (fax)    Patient ID: Wilian Padron 1980  Referring Provider: Dr. Tavarez     Patient's name and  were verified.    Subjective:    He presents for a follow-up diabetic visit. He has type 2 diabetes mellitus. He is noncompliant a majority of the time.  Assessment:     Lab Results   Component Value Date/Time    LABA1C 13.1 2024 09:21 AM    LABA1C 14.0 2024 10:54 AM    BUN 16 04/15/2023 09:18 AM    BUN 16 04/15/2023 09:18 AM    CREATININE 0.80 04/15/2023 09:18 AM    CREATININE 0.79 04/15/2023 09:18 AM     Vitals:    24 0959   BP: 128/82   Pulse: 87   Temp: 98.4 °F (36.9 °C)   Weight: 92.7 kg (204 lb 6.4 oz)     Wt Readings from Last 3 Encounters:   24 92.7 kg (204 lb 6.4 oz)   24 92.3 kg (203 lb 6.4 oz)   24 90 kg (198 lb 6.4 oz)     Ht Readings from Last 3 Encounters:   24 1.88 m (6' 2\")   12/15/23 1.88 m (6' 2\")   23 1.88 m (6' 2\")     Est, Glom Filt Rate   Date Value Ref Range Status   2023 >60 >60 ml/min/1.73m2 Final       Diabetes Pharmacotherapy:  Metformin 1000mg BID  Glimepiride 4mg BID  Jardiance 10mg daily  Rybelsus 14mg daily  Januvia 100mg daily  Levemir --not taking    Glucose Trends:   Glucose at 1.5 hrs PPD today resulted at 330 mg/dl  Current monitoring regimen: Freestyle [place] 2 CGM - checks 4+ times daily  Home blood sugar trends:   Any episodes of hypoglycemia? no    Lifestyle Factors:   Previous visit with dietician: yes - 2023  Current diet: Wake up 9PM           B: 3AM: fiesta pizza            L: 8AM: cookies & milk OR snack                       D: usually skips OR T bone steak/rice/corn when off work                       Snacks:                        Beverages: regular sodas (Dr. Pepper, [facility])- 1-2 daily, 16 oz glass 3-4x daily  Current exercise:  very active at work    Health Maintenance:  Diabetes Management   Topic Date Due

## 2024-05-13 NOTE — PATIENT INSTRUCTIONS
We will work on trying to get Bempedoic Acid (Nexletol) approved to replace the Repatha  -This is a daily pill    2. We will try again to get inhaled insulin (Afrezza) covered through your insurance    3. Try to limit milk to 1-2 glasses of 1% milk       Try to limit soda to 1 regular soda per day     4. Time to get your diabetes eye exam scheduled

## 2024-05-21 VITALS
DIASTOLIC BLOOD PRESSURE: 82 MMHG | HEART RATE: 87 BPM | TEMPERATURE: 98.4 F | BODY MASS INDEX: 26.24 KG/M2 | WEIGHT: 204.4 LBS | SYSTOLIC BLOOD PRESSURE: 128 MMHG

## 2024-06-06 ENCOUNTER — TELEPHONE (OUTPATIENT)
Dept: INTERNAL MEDICINE CLINIC | Age: 44
End: 2024-06-06

## 2024-06-14 LAB — KEPPRA: 16.2 MCG/ML (ref 12–46)

## 2024-07-19 ENCOUNTER — OFFICE VISIT (OUTPATIENT)
Dept: NEUROLOGY | Age: 44
End: 2024-07-19
Payer: COMMERCIAL

## 2024-07-19 VITALS
SYSTOLIC BLOOD PRESSURE: 130 MMHG | WEIGHT: 201.6 LBS | OXYGEN SATURATION: 95 % | HEART RATE: 84 BPM | HEIGHT: 74 IN | DIASTOLIC BLOOD PRESSURE: 68 MMHG | BODY MASS INDEX: 25.87 KG/M2

## 2024-07-19 DIAGNOSIS — G40.909 SEIZURE DISORDER (HCC): Primary | ICD-10-CM

## 2024-07-19 PROCEDURE — 99213 OFFICE O/P EST LOW 20 MIN: CPT | Performed by: NURSE PRACTITIONER

## 2024-07-19 NOTE — PROGRESS NOTES
social history and family history.       PE:   Vitals:    07/19/24 1015   BP: 130/68   Site: Left Upper Arm   Position: Sitting   Cuff Size: Large Adult   Pulse: 84   SpO2: 95%   Weight: 91.4 kg (201 lb 9.6 oz)   Height: 1.88 m (6' 2\")        General Appearance:  awake, alert, oriented, in no distress.  Gen: NAD, Language is Intact. Skin: no rash, lesion, dry  to touch. warm  Head: no rash, no icterus  Neck: The Neck is supple.   Neuro: CN 2-12 grossly intact with no focal deficits. Power 5/5 Throughout symmetric, Reflexes are  symmetric. Long tracts are intact. Cerebellar exam is Intact. Sensory exam is intact to light touch.  Gait is intact.  Musculoskeletal:  Has no hand arthritis, no limitation of ROM in any of the four extremities.  Lower extremities no edema          DATA:         Results for orders placed or performed in visit on 06/08/24   Levetiracetam Level   Result Value Ref Range    Levetiracetam Lvl 16.2 12.0 - 46.0 mcg/mL           Assessment:     Diagnosis Orders   1. Seizure disorder (HCC)  Levetiracetam Level           Follow up for seizure. He is doing well no reported seizure. He is on Keppra 750 mg twice a day. He is compliant. Most recent Keppra level done 6/8/24=16.2. His last seizure was in 2015. His diabetes control has been a struggle, he has been refusing insulin and any shots. Most recent BP5G=48.1 done 5/13/24. He is alert and oriented. His exam is non focal. He was counseled to take his medications and improve compliance. After a discussion with patient and his mother we agreed on the following plan.           Plan:  Continue with Keppra 750 mg twice a day. Refills given.   Obtain Keppra level to be done 12/2024   Reports any new events/seizure.   Follow up in 6 months or sooner if needed.   Call if any questions or concerns.         Total time 22 min    Paige L Leopold, APRN - CNP

## 2024-07-19 NOTE — PATIENT INSTRUCTIONS
Continue with Keppra 750 mg twice a day. Refills given.   Obtain Keppra level to be done 12/2024   Reports any new events/seizure.   Follow up in 6 months or sooner if needed.   Call if any questions or concerns.

## 2024-07-24 ENCOUNTER — OFFICE VISIT (OUTPATIENT)
Dept: INTERNAL MEDICINE CLINIC | Age: 44
End: 2024-07-24
Payer: COMMERCIAL

## 2024-07-24 DIAGNOSIS — E11.9 TYPE 2 DIABETES MELLITUS WITHOUT COMPLICATION, WITHOUT LONG-TERM CURRENT USE OF INSULIN (HCC): Primary | ICD-10-CM

## 2024-07-24 PROCEDURE — G0108 DIAB MANAGE TRN  PER INDIV: HCPCS | Performed by: PHARMACIST

## 2024-07-24 PROCEDURE — NBSRV NON-BILLABLE SERVICE: Performed by: PHARMACIST

## 2024-07-24 NOTE — PROGRESS NOTES
[facility]  [address] [place] [address]  [phone] (phone)  [phone] (fax)    Patient ID: Wilian Padron 1980  Referring Provider: Dr. Tavarez     Patient's name and  were verified.    Subjective:    He presents for a follow-up diabetic visit. He has type 2 diabetes mellitus. He is noncompliant a majority of the time.  Assessment:     Lab Results   Component Value Date/Time    LABA1C 13.1 2024 09:21 AM    LABA1C 14.0 2024 10:54 AM    BUN 16 04/15/2023 09:18 AM    BUN 16 04/15/2023 09:18 AM    CREATININE 0.80 04/15/2023 09:18 AM    CREATININE 0.79 04/15/2023 09:18 AM     There were no vitals filed for this visit.  Wt Readings from Last 3 Encounters:   24 91.4 kg (201 lb 9.6 oz)   24 92.7 kg (204 lb 6.4 oz)   24 92.3 kg (203 lb 6.4 oz)     Ht Readings from Last 3 Encounters:   24 1.88 m (6' 2\")   24 1.88 m (6' 2\")   12/15/23 1.88 m (6' 2\")     Est, Glom Filt Rate   Date Value Ref Range Status   2023 >60 >60 ml/min/1.73m2 Final     Diabetes Pharmacotherapy:  Jardiance 10mg daily  Levemir - not taking  Glimepiride 4mg BID- currently out  Metformin 1000mg BID  Rybelsus 14mg daily    Glucose Trends:   Glucose at 3 hrs PPD today resulted at 348mg/dl  Current monitoring regimen: Fingerstick blood tests - 0 times daily  Home blood sugar trends: none  Any episodes of hypoglycemia? No symptomatic lows    Lifestyle Factors:   Previous visit with dietician: yes - 2023  Current diet: B: 6:30a pizza sub            L: snack                       D: full meal on off days from work OR snack                       Snacks:                        Beverages: regular soda - 1-2 daily, some water  Current exercise:  works third shift, active with job    Health Maintenance:  Diabetes Management   Topic Date Due    Diabetic retinal exam  10/22/2023    Diabetic Alb to Cr ratio (uACR) test  2024    Diabetic foot exam  2024    A1C test (Diabetic or

## 2024-07-24 NOTE — PATIENT INSTRUCTIONS
1.Time to get your eye exam scheduled    2. Let us know if you want help with an insulin shot or if you want to schedule an afternoon appointment with Dr. Jiménez

## 2024-08-28 VITALS
WEIGHT: 201.6 LBS | BODY MASS INDEX: 25.88 KG/M2 | DIASTOLIC BLOOD PRESSURE: 82 MMHG | HEART RATE: 87 BPM | SYSTOLIC BLOOD PRESSURE: 134 MMHG | TEMPERATURE: 97.9 F

## 2025-01-31 ENCOUNTER — OFFICE VISIT (OUTPATIENT)
Dept: NEUROLOGY | Age: 45
End: 2025-01-31
Payer: COMMERCIAL

## 2025-01-31 VITALS
HEART RATE: 92 BPM | SYSTOLIC BLOOD PRESSURE: 132 MMHG | WEIGHT: 202 LBS | BODY MASS INDEX: 25.93 KG/M2 | HEIGHT: 74 IN | DIASTOLIC BLOOD PRESSURE: 84 MMHG

## 2025-01-31 DIAGNOSIS — G40.909 SEIZURE DISORDER (HCC): Primary | ICD-10-CM

## 2025-01-31 PROCEDURE — 99213 OFFICE O/P EST LOW 20 MIN: CPT | Performed by: PSYCHIATRY & NEUROLOGY

## 2025-01-31 RX ORDER — LEVETIRACETAM 750 MG/1
750 TABLET ORAL 2 TIMES DAILY
Qty: 180 TABLET | Refills: 3 | Status: SHIPPED | OUTPATIENT
Start: 2025-01-31

## 2025-01-31 RX ORDER — ROSUVASTATIN CALCIUM 10 MG/1
10 TABLET, COATED ORAL DAILY
COMMUNITY

## 2025-01-31 NOTE — PROGRESS NOTES
NEUROLOGY OUT PATIENT FOLLOW UP NOTE:  1/31/202510:05 AM    Wilian Padron is here for follow up for seizure.            No Known Allergies    Current Outpatient Medications:     rosuvastatin (CRESTOR) 10 MG tablet, Take 1 tablet by mouth daily, Disp: , Rfl:     NONFORMULARY, Take 1 capsule by mouth daily MegaRed fish oil supplement, Disp: , Rfl:     Multiple Vitamins-Minerals (THERAPEUTIC MULTIVITAMIN-MINERALS) tablet, Take 1 tablet by mouth daily, Disp: , Rfl:     NONFORMULARY, Milk thistle, Disp: , Rfl:     levETIRAcetam (KEPPRA) 750 MG tablet, Take 1 tablet by mouth 2 times daily, Disp: 180 tablet, Rfl: 3    empagliflozin (JARDIANCE) 10 MG tablet, Take 1 tablet by mouth daily, Disp: 30 tablet, Rfl: 3    glimepiride (AMARYL) 4 MG tablet, Take 1 tablet by mouth before breakfast and before supper *Dose increase, Disp: 180 tablet, Rfl: 1    Semaglutide (RYBELSUS) 14 MG TABS, Take 1 tablet by mouth daily, Disp: , Rfl:     Glucose Blood (BLOOD GLUCOSE TEST VI), by In Vitro route Test 2 times per day. Reli On meter, Disp: , Rfl:     ezetimibe (ZETIA) 10 MG tablet, Take 1 tablet by mouth daily, Disp: , Rfl:     SITagliptin (JANUVIA) 100 MG tablet, Take 1 tablet by mouth daily, Disp: , Rfl:     metFORMIN (GLUCOPHAGE) 1000 MG tablet, Take 1 tablet by mouth 2 times daily (with meals), Disp: , Rfl:     acetaminophen (TYLENOL) 500 MG tablet, Take 1 tablet by mouth every 6 hours as needed for Pain, Disp: , Rfl:     Blood Glucose Monitoring Suppl (BLOOD GLUCOSE METER) KIT, RELI-ON METER. Use as directed, Disp: 1 kit, Rfl: 0    insulin detemir (LEVEMIR) 100 UNIT/ML injection pen, 10 units Subcutaneous once daily for 90 days (Patient not taking: Reported on 7/31/2023), Disp: , Rfl:     Evolocumab (REPATHA SURECLICK) 140 MG/ML SOAJ, Inject 1 each into the skin every 14 days (Patient not taking: Reported on 7/24/2024), Disp: , Rfl:     I reviewed the past medical history, allergies, medications, social history and family history.

## 2025-01-31 NOTE — PATIENT INSTRUCTIONS
Continue with Keppra 750 mg twice a day. Refills given.   Obtain Keppra level.   Reports any new events/seizure.   Follow up in 6 months or sooner if needed.   Call if any questions or concerns.

## 2025-02-04 LAB — KEPPRA: NORMAL MCG/ML (ref 12–46)

## 2025-02-06 ENCOUNTER — TELEPHONE (OUTPATIENT)
Dept: NEUROLOGY | Age: 45
End: 2025-02-06

## 2025-02-06 NOTE — TELEPHONE ENCOUNTER
Spoke with [facility], and apparently lab was unable to locate specimen- TNP    The Lab reports the patient can stop by and they will redraw the lab.

## 2025-02-06 NOTE — TELEPHONE ENCOUNTER
----- Message from Paige Leopold, APRN - CNP sent at 2/5/2025  8:03 AM EST -----  Please call the lab and the patient for repeat Keppra level. Apparently the test was not performed  There is an order in the system from Dr. Arriaga on 1/31/25  Paige Leopold, CNP

## 2025-02-07 NOTE — TELEPHONE ENCOUNTER
Spoke to Mom-Steffi regarding message.   Verbalized understanding. No additional questions. Will have lab drawn again.

## 2025-02-20 LAB — KEPPRA: 8.2 MCG/ML (ref 12–46)

## 2025-02-21 ENCOUNTER — TELEPHONE (OUTPATIENT)
Dept: NEUROLOGY | Age: 45
End: 2025-02-21

## 2025-02-21 NOTE — TELEPHONE ENCOUNTER
Mother Yolanda informed and reports he will have his lab work drawn at [facility] [address].    Patient just switched to day shift and was working a lot of crazy hours on night shift. Yolanda does not think he has missed any doses.     Yolanda will have patient recheck lab work in one week.

## 2025-02-21 NOTE — TELEPHONE ENCOUNTER
----- Message from Paige Leopold, APRN - CNP sent at 2/20/2025 11:05 AM EST -----  Please let patient know his Keppra level is low=8.2. Please verify with him the dose and frequency of the keppra he is taking.   Has he missed any doses?  Please ask him to take the keppra consistently as prescribed and repeat Keppra level in 1 week  Paige Leopold, CNP

## 2025-03-20 LAB — KEPPRA: 17.6 MCG/ML (ref 12–46)

## 2025-05-01 ENCOUNTER — TELEPHONE (OUTPATIENT)
Dept: INTERNAL MEDICINE CLINIC | Age: 45
End: 2025-05-01

## 2025-05-01 NOTE — TELEPHONE ENCOUNTER
Received call from mother, Partha. Brennan has been prescribed the [place] 3; however, it was not able to get covered. We discussed that it won't be covered without insulin use.

## 2025-06-24 ENCOUNTER — OFFICE VISIT (OUTPATIENT)
Dept: INTERNAL MEDICINE CLINIC | Age: 45
End: 2025-06-24
Payer: COMMERCIAL

## 2025-06-24 VITALS
BODY MASS INDEX: 24.82 KG/M2 | HEART RATE: 92 BPM | WEIGHT: 193.4 LBS | SYSTOLIC BLOOD PRESSURE: 100 MMHG | TEMPERATURE: 97.9 F | HEIGHT: 74 IN | DIASTOLIC BLOOD PRESSURE: 68 MMHG

## 2025-06-24 DIAGNOSIS — E11.65 TYPE 2 DIABETES MELLITUS WITH HYPERGLYCEMIA, WITH LONG-TERM CURRENT USE OF INSULIN (HCC): Primary | ICD-10-CM

## 2025-06-24 DIAGNOSIS — Z79.4 TYPE 2 DIABETES MELLITUS WITH HYPERGLYCEMIA, WITH LONG-TERM CURRENT USE OF INSULIN (HCC): Primary | ICD-10-CM

## 2025-06-24 LAB — HBA1C MFR BLD: 13.2 % (ref 4.3–5.7)

## 2025-06-24 PROCEDURE — 83036 HEMOGLOBIN GLYCOSYLATED A1C: CPT

## 2025-06-24 PROCEDURE — 3046F HEMOGLOBIN A1C LEVEL >9.0%: CPT

## 2025-06-24 PROCEDURE — 99203 OFFICE O/P NEW LOW 30 MIN: CPT

## 2025-06-24 RX ORDER — HYDROCHLOROTHIAZIDE 12.5 MG/1
CAPSULE ORAL
Qty: 2 EACH | Refills: 3 | Status: SHIPPED | OUTPATIENT
Start: 2025-06-24

## 2025-06-24 RX ORDER — INSULIN GLARGINE 100 [IU]/ML
15 INJECTION, SOLUTION SUBCUTANEOUS NIGHTLY
Qty: 5 ADJUSTABLE DOSE PRE-FILLED PEN SYRINGE | Refills: 3 | Status: SHIPPED | OUTPATIENT
Start: 2025-06-24

## 2025-06-24 RX ORDER — HYDROCHLOROTHIAZIDE 12.5 MG/1
CAPSULE ORAL
Qty: 2 EACH | Refills: 3 | Status: SHIPPED
Start: 2025-06-24 | End: 2025-06-24

## 2025-06-24 RX ORDER — INSULIN GLARGINE 100 [IU]/ML
15 INJECTION, SOLUTION SUBCUTANEOUS DAILY
Qty: 5 ADJUSTABLE DOSE PRE-FILLED PEN SYRINGE | Refills: 3 | Status: SHIPPED
Start: 2025-06-24 | End: 2025-06-24

## 2025-06-24 NOTE — PROGRESS NOTES
1980    Chief Complaint   Patient presents with    New Patient     DM        Pt is a 44 y.o. male who presents for an initial visit.  He takes Rybelsus 14 mg daily, Januvia 100mg daily, Metformin 1000 mg 2x daily, Jardiance 10 mg, glimepride 40 mg.  He has had weight loss over the past several months. He was diagnosed Type 2 diabetes about 5 years ago and has not had any improvement in A1C despite being on high doses of multiple oral medications. He was briefly started on lantus but stopped after 2 doses because he does not like needles. He has been persistently hypoglycemic with limited interest in checking blood glucose and starting injectable medications. We discussed the adverse effects of continuing to have severe hyperglycemia and the benefits of getting better glycemic control and he was amenable to starting a daily injectable insulin like lantus. He was also interested in using a CGM to better monitor his blood glucose. He did not appear to be interested in making dietary changes at that time, but we will further address this at future visits. He is also on a rather intensive regmine of oral medications and did not appear to have any significant improvement in A1C. He also reports a rather abrupt onset of diabetes with normal weight. His presentation and a lack of response to oral medications is concerning for Late-onset Autoimmune Diabetes of Adulthood, and it does not appear that he has had workup for this in the past.           Past Medical History:   Diagnosis Date    Anxiety     Diabetes mellitus (HCC)     Seizures (HCC)        History reviewed. No pertinent surgical history.    Current Outpatient Medications   Medication Sig Dispense Refill    insulin glargine (LANTUS SOLOSTAR) 100 UNIT/ML injection pen Inject 15 Units into the skin at bedtime 5 Adjustable Dose Pre-filled Pen Syringe 3    Insulin Pen Needle 32G X 4 MM MISC 1 each by Does not apply route daily 100 each 3    Continuous Glucose

## 2025-06-26 ENCOUNTER — TELEPHONE (OUTPATIENT)
Dept: INTERNAL MEDICINE CLINIC | Age: 45
End: 2025-06-26

## 2025-06-26 RX ORDER — INSULIN GLARGINE-YFGN 100 [IU]/ML
15 INJECTION, SOLUTION SUBCUTANEOUS DAILY
Qty: 15 ML | Refills: 3 | Status: SHIPPED | OUTPATIENT
Start: 2025-06-26 | End: 2025-07-26

## 2025-06-26 NOTE — TELEPHONE ENCOUNTER
Lantus is not preferred by insurance, please choose insulin from this list and send to pharmacy.

## 2025-07-09 DIAGNOSIS — E11.65 TYPE 2 DIABETES MELLITUS WITH HYPERGLYCEMIA, WITH LONG-TERM CURRENT USE OF INSULIN (HCC): Primary | ICD-10-CM

## 2025-07-09 DIAGNOSIS — Z79.4 TYPE 2 DIABETES MELLITUS WITH HYPERGLYCEMIA, WITH LONG-TERM CURRENT USE OF INSULIN (HCC): Primary | ICD-10-CM

## 2025-07-09 RX ORDER — KETOROLAC TROMETHAMINE 30 MG/ML
INJECTION, SOLUTION INTRAMUSCULAR; INTRAVENOUS
Qty: 1 EACH | Refills: 0 | Status: SHIPPED | OUTPATIENT
Start: 2025-07-09

## 2025-07-31 ENCOUNTER — RESULTS FOLLOW-UP (OUTPATIENT)
Dept: NEUROLOGY | Age: 45
End: 2025-07-31

## 2025-07-31 DIAGNOSIS — G40.909 SEIZURE DISORDER (HCC): Primary | ICD-10-CM

## 2025-07-31 LAB — KEPPRA: 6 MCG/ML (ref 12–46)

## 2025-07-31 NOTE — RESULT ENCOUNTER NOTE
Please ask patient if taking the Keppra correctly, level is low =6, please verify dosage.  If not taken consistently, please advise them to do so and have the level repeated in one week. Reordered.      Essence Arriaga MD

## 2025-08-01 ENCOUNTER — OFFICE VISIT (OUTPATIENT)
Dept: NEUROLOGY | Age: 45
End: 2025-08-01
Payer: COMMERCIAL

## 2025-08-01 VITALS
WEIGHT: 198.2 LBS | DIASTOLIC BLOOD PRESSURE: 64 MMHG | OXYGEN SATURATION: 99 % | SYSTOLIC BLOOD PRESSURE: 108 MMHG | BODY MASS INDEX: 25.44 KG/M2 | HEART RATE: 76 BPM | HEIGHT: 74 IN

## 2025-08-01 DIAGNOSIS — G40.909 SEIZURE DISORDER (HCC): Primary | ICD-10-CM

## 2025-08-01 PROCEDURE — 99213 OFFICE O/P EST LOW 20 MIN: CPT | Performed by: NURSE PRACTITIONER

## 2025-08-01 NOTE — PATIENT INSTRUCTIONS
Continue with Keppra 750 mg twice a day. Refills given.   Obtain Keppra level, to be done in 1 week.   Reports any new events/seizure.   Follow up in 6 months or sooner if needed.   Call if any questions or concerns.

## 2025-08-01 NOTE — PROGRESS NOTES
NEUROLOGY OUT PATIENT FOLLOW UP NOTE:  8/1/20258:04 AM    Wilian Padron is here for follow up for seizure.            No Known Allergies    Current Outpatient Medications:     Continuous Glucose  (FREESTYLE KENDRICK 3 READER) YUE, Use to check glucose, Disp: 1 each, Rfl: 0    insulin glargine (LANTUS SOLOSTAR) 100 UNIT/ML injection pen, Inject 15 Units into the skin at bedtime, Disp: 5 Adjustable Dose Pre-filled Pen Syringe, Rfl: 3    Insulin Pen Needle 32G X 4 MM MISC, 1 each by Does not apply route daily, Disp: 100 each, Rfl: 3    Continuous Glucose Sensor (FREESTYLE KENDRICK 3 PLUS SENSOR) MISC, Use as directed, Disp: 2 each, Rfl: 3    rosuvastatin (CRESTOR) 10 MG tablet, Take 1 tablet by mouth daily, Disp: , Rfl:     levETIRAcetam (KEPPRA) 750 MG tablet, Take 1 tablet by mouth 2 times daily, Disp: 180 tablet, Rfl: 3    empagliflozin (JARDIANCE) 10 MG tablet, Take 1 tablet by mouth daily, Disp: 30 tablet, Rfl: 3    glimepiride (AMARYL) 4 MG tablet, Take 1 tablet by mouth before breakfast and before supper *Dose increase, Disp: 180 tablet, Rfl: 1    Semaglutide (RYBELSUS) 14 MG TABS, Take 1 tablet by mouth daily, Disp: , Rfl:     Glucose Blood (BLOOD GLUCOSE TEST VI), by In Vitro route Test 2 times per day. Reli On meter, Disp: , Rfl:     ezetimibe (ZETIA) 10 MG tablet, Take 1 tablet by mouth daily, Disp: , Rfl:     SITagliptin (JANUVIA) 100 MG tablet, Take 1 tablet by mouth daily, Disp: , Rfl:     metFORMIN (GLUCOPHAGE) 1000 MG tablet, Take 1 tablet by mouth 2 times daily (with meals), Disp: , Rfl:     acetaminophen (TYLENOL) 500 MG tablet, Take 1 tablet by mouth every 6 hours as needed for Pain, Disp: , Rfl:     Blood Glucose Monitoring Suppl (BLOOD GLUCOSE METER) KIT, RELI-ON METER. Use as directed, Disp: 1 kit, Rfl: 0    NONFORMULARY, Take 1 capsule by mouth daily MegaRed fish oil supplement, Disp: , Rfl:     Multiple Vitamins-Minerals (THERAPEUTIC MULTIVITAMIN-MINERALS) tablet, Take 1 tablet by mouth

## 2025-08-12 ENCOUNTER — OFFICE VISIT (OUTPATIENT)
Dept: INTERNAL MEDICINE CLINIC | Age: 45
End: 2025-08-12

## 2025-08-12 VITALS
SYSTOLIC BLOOD PRESSURE: 106 MMHG | BODY MASS INDEX: 24.9 KG/M2 | OXYGEN SATURATION: 99 % | HEIGHT: 74 IN | WEIGHT: 194 LBS | DIASTOLIC BLOOD PRESSURE: 60 MMHG | HEART RATE: 80 BPM

## 2025-08-12 DIAGNOSIS — E78.00 PURE HYPERCHOLESTEROLEMIA: ICD-10-CM

## 2025-08-12 DIAGNOSIS — E11.9 TYPE 2 DIABETES MELLITUS WITHOUT COMPLICATION, WITHOUT LONG-TERM CURRENT USE OF INSULIN (HCC): Primary | ICD-10-CM

## 2025-08-12 RX ORDER — ROSUVASTATIN CALCIUM 10 MG/1
10 TABLET, COATED ORAL DAILY
COMMUNITY

## 2025-08-12 SDOH — ECONOMIC STABILITY: FOOD INSECURITY: WITHIN THE PAST 12 MONTHS, YOU WORRIED THAT YOUR FOOD WOULD RUN OUT BEFORE YOU GOT MONEY TO BUY MORE.: NEVER TRUE

## 2025-08-12 SDOH — ECONOMIC STABILITY: FOOD INSECURITY: WITHIN THE PAST 12 MONTHS, THE FOOD YOU BOUGHT JUST DIDN'T LAST AND YOU DIDN'T HAVE MONEY TO GET MORE.: NEVER TRUE

## 2025-08-12 ASSESSMENT — PATIENT HEALTH QUESTIONNAIRE - PHQ9
1. LITTLE INTEREST OR PLEASURE IN DOING THINGS: NOT AT ALL
SUM OF ALL RESPONSES TO PHQ QUESTIONS 1-9: 0
2. FEELING DOWN, DEPRESSED OR HOPELESS: NOT AT ALL

## 2025-08-25 DIAGNOSIS — E11.9 TYPE 2 DIABETES MELLITUS WITHOUT COMPLICATION, WITHOUT LONG-TERM CURRENT USE OF INSULIN (HCC): Primary | ICD-10-CM

## 2025-08-25 RX ORDER — HYDROCHLOROTHIAZIDE 12.5 MG/1
CAPSULE ORAL
Qty: 6 EACH | Refills: 5 | Status: SHIPPED | OUTPATIENT
Start: 2025-08-25

## 2025-08-27 ENCOUNTER — RESULTS FOLLOW-UP (OUTPATIENT)
Dept: NEUROLOGY | Age: 45
End: 2025-08-27

## 2025-08-27 DIAGNOSIS — G40.909 SEIZURE DISORDER (HCC): Primary | ICD-10-CM

## 2025-08-27 LAB — KEPPRA: 9.2 MCG/ML (ref 12–46)

## 2025-09-03 RX ORDER — LEVETIRACETAM 1000 MG/1
1000 TABLET ORAL 2 TIMES DAILY
Qty: 60 TABLET | Refills: 3 | Status: SHIPPED | OUTPATIENT
Start: 2025-09-03